# Patient Record
Sex: MALE | Race: WHITE
[De-identification: names, ages, dates, MRNs, and addresses within clinical notes are randomized per-mention and may not be internally consistent; named-entity substitution may affect disease eponyms.]

---

## 2020-06-26 ENCOUNTER — HOSPITAL ENCOUNTER (INPATIENT)
Dept: HOSPITAL 74 - JER | Age: 60
LOS: 6 days | Discharge: HOME | DRG: 975 | End: 2020-07-02
Attending: FAMILY MEDICINE | Admitting: FAMILY MEDICINE
Payer: COMMERCIAL

## 2020-06-26 VITALS — BODY MASS INDEX: 24.3 KG/M2

## 2020-06-26 DIAGNOSIS — B15.9: ICD-10-CM

## 2020-06-26 DIAGNOSIS — Z11.59: ICD-10-CM

## 2020-06-26 DIAGNOSIS — B59: ICD-10-CM

## 2020-06-26 DIAGNOSIS — D63.8: ICD-10-CM

## 2020-06-26 DIAGNOSIS — R16.1: ICD-10-CM

## 2020-06-26 DIAGNOSIS — D70.9: ICD-10-CM

## 2020-06-26 DIAGNOSIS — M21.379: ICD-10-CM

## 2020-06-26 DIAGNOSIS — E86.0: ICD-10-CM

## 2020-06-26 DIAGNOSIS — F10.21: ICD-10-CM

## 2020-06-26 DIAGNOSIS — J44.9: ICD-10-CM

## 2020-06-26 DIAGNOSIS — B37.0: ICD-10-CM

## 2020-06-26 DIAGNOSIS — R74.8: ICD-10-CM

## 2020-06-26 DIAGNOSIS — I10: ICD-10-CM

## 2020-06-26 DIAGNOSIS — E78.5: ICD-10-CM

## 2020-06-26 DIAGNOSIS — B20: Primary | ICD-10-CM

## 2020-06-26 DIAGNOSIS — E11.9: ICD-10-CM

## 2020-06-26 DIAGNOSIS — R63.4: ICD-10-CM

## 2020-06-26 DIAGNOSIS — R19.7: ICD-10-CM

## 2020-06-26 DIAGNOSIS — R63.0: ICD-10-CM

## 2020-06-26 DIAGNOSIS — K76.0: ICD-10-CM

## 2020-06-26 DIAGNOSIS — K80.50: ICD-10-CM

## 2020-06-26 LAB
ALBUMIN SERPL-MCNC: 2.7 G/DL (ref 3.4–5)
ALP SERPL-CCNC: 100 U/L (ref 45–117)
ALT SERPL-CCNC: 23 U/L (ref 13–61)
ANION GAP SERPL CALC-SCNC: 14 MMOL/L (ref 8–16)
APPEARANCE UR: CLEAR
AST SERPL-CCNC: 69 U/L (ref 15–37)
BASOPHILS # BLD: 1.1 % (ref 0–2)
BILIRUB SERPL-MCNC: 0.8 MG/DL (ref 0.2–1)
BILIRUB UR STRIP.AUTO-MCNC: NEGATIVE MG/DL
BUN SERPL-MCNC: 54.3 MG/DL (ref 7–18)
CALCIUM SERPL-MCNC: 8.8 MG/DL (ref 8.5–10.1)
CHLORIDE SERPL-SCNC: 104 MMOL/L (ref 98–107)
CO2 SERPL-SCNC: 16 MMOL/L (ref 21–32)
COLOR UR: YELLOW
CREAT SERPL-MCNC: 1.6 MG/DL (ref 0.55–1.3)
DEPRECATED RDW RBC AUTO: 15.3 % (ref 11.9–15.9)
EOSINOPHIL # BLD: 3 % (ref 0–4.5)
GLUCOSE SERPL-MCNC: 88 MG/DL (ref 74–106)
HCT VFR BLD CALC: 33.9 % (ref 35.4–49)
HGB BLD-MCNC: 11.3 GM/DL (ref 11.7–16.9)
INR BLD: 1.18 (ref 0.83–1.09)
KETONES UR QL STRIP: (no result)
LEUKOCYTE ESTERASE UR QL STRIP.AUTO: NEGATIVE
LIPASE SERPL-CCNC: 215 U/L (ref 73–393)
LYMPHOCYTES # BLD: 9.8 % (ref 8–40)
MAGNESIUM SERPL-MCNC: 2.1 MG/DL (ref 1.8–2.4)
MCH RBC QN AUTO: 27 PG (ref 25.7–33.7)
MCHC RBC AUTO-ENTMCNC: 33.3 G/DL (ref 32–35.9)
MCV RBC: 81.3 FL (ref 80–96)
MONOCYTES # BLD AUTO: 6.1 % (ref 3.8–10.2)
NEUTROPHILS # BLD: 80 % (ref 42.8–82.8)
NITRITE UR QL STRIP: NEGATIVE
PH UR: 5 [PH] (ref 5–8)
PLATELET # BLD AUTO: 275 K/MM3 (ref 134–434)
PMV BLD: 8.9 FL (ref 7.5–11.1)
POTASSIUM SERPLBLD-SCNC: 5 MMOL/L (ref 3.5–5.1)
PROT SERPL-MCNC: 8.4 G/DL (ref 6.4–8.2)
PROT UR QL STRIP: (no result)
PROT UR QL STRIP: NEGATIVE
PT PNL PPP: 13.9 SEC (ref 9.7–13)
RBC # BLD AUTO: 4.17 M/MM3 (ref 4–5.6)
SODIUM SERPL-SCNC: 134 MMOL/L (ref 136–145)
SP GR UR: 1.04 (ref 1.01–1.03)
UROBILINOGEN UR STRIP-MCNC: 0.2 MG/DL (ref 0.2–1)
WBC # BLD AUTO: 4.5 K/MM3 (ref 4–10)

## 2020-06-26 PROCEDURE — G0009 ADMIN PNEUMOCOCCAL VACCINE: HCPCS

## 2020-06-26 PROCEDURE — A9579 GAD-BASE MR CONTRAST NOS,1ML: HCPCS

## 2020-06-26 PROCEDURE — U0003 INFECTIOUS AGENT DETECTION BY NUCLEIC ACID (DNA OR RNA); SEVERE ACUTE RESPIRATORY SYNDROME CORONAVIRUS 2 (SARS-COV-2) (CORONAVIRUS DISEASE [COVID-19]), AMPLIFIED PROBE TECHNIQUE, MAKING USE OF HIGH THROUGHPUT TECHNOLOGIES AS DESCRIBED BY CMS-2020-01-R: HCPCS

## 2020-06-26 RX ADMIN — BUDESONIDE AND FORMOTEROL FUMARATE DIHYDRATE SCH: 80; 4.5 AEROSOL RESPIRATORY (INHALATION) at 23:40

## 2020-06-26 NOTE — EKG
Test Reason : 

Blood Pressure : ***/*** mmHG

Vent. Rate : 123 BPM     Atrial Rate : 123 BPM

   P-R Int : 136 ms          QRS Dur : 066 ms

    QT Int : 292 ms       P-R-T Axes : 073 026 037 degrees

   QTc Int : 418 ms

 

SINUS TACHYCARDIA

NO PREVIOUS ECGS AVAILABLE

Confirmed by PHOENIX WHITT MD ([id number]) on 6/26/2020 2:42:02 PM

 

Referred By:             Confirmed By:PHOENIX WHITT MD

## 2020-06-26 NOTE — HP
Admitting History and Physical





- Admission


Chief Complaint: pt c/o loose bm 2 to 3 mths nausea loss wt 23 lbs weak dizzy no

cp no sob.  no palpitations no abd pain pt poor historian daughter with him 

renal numbers indicate dehydration ? ct result chest also mnoted ground glass ? 

?covid lt foot drop chronic sed to accident in past


History Source: Patient, Family Member


Limitations to Obtaining History: No Limitations





- Past Medical History


CNS: Yes: Other (chr foot droop lt foot)


Cardiovascular: Yes: HTN


Endocrine: Yes: Diabetes Mellitus





- Past Surgical History


Past Surgical History: Yes: Cholecystectomy, Hernia Repair, Splenectomy





- Smoking History


Smoking history: Former smoker


Have you smoked in the past 12 months: No





- Alcohol/Substance Use


Hx Alcohol Use: No





- Social History


Usual Living Arrangement: Yes: With Spouse


Do you think of yourself as: Straight/Heterosexual


History of Recent Travel: No





Home Medications





- Allergies


Allergies/Adverse Reactions: 


                                    Allergies











Allergy/AdvReac Type Severity Reaction Status Date / Time


 


No Known Allergies Allergy   Verified 06/26/20 12:49














- Home Medications


Home Medications: 


Ambulatory Orders





Atorvastatin Ca [Lipitor] 20 mg PO HS 06/26/20 


Budesonide/Formeterol Fumarate [SYMBICORT 80/4.5mcg -] 1 inh PO BID 06/26/20 


Ferrous Sulfate [Iron] 325 mg PO DAILY 06/26/20 


Lisinopril [Prinivil] 10 mg PO DAILY 06/26/20 











Family Medical History


Family History: Unremarkable





Review of Systems





- Review of Systems


Constitutional: reports: Malaise


Eyes: reports: Blurred Vision


HENT: reports: No Symptoms


Neck: reports: No Symptoms


Cardiovascular: reports: No Symptoms


Respiratory: reports: SOB on Exertion


Gastrointestinal: reports: Diarrhea, Other (nausea)


Genitourinary: reports: No Symptoms


Breasts: reports: No Symptoms Reported


Musculoskeletal: reports: Back Pain


Integumentary: reports: No Symptoms


Neurological: reports: Other (lt foor chr foot drop)


Endocrine: reports: Unexplained Weight Loss


Hematology/Lymphatic: reports: No Symptoms


Psychiatric: reports: No Symptoms





Physical Examination


Vital Signs: 


                                   Vital Signs











Temperature  98.7 F   06/26/20 17:41


 


Pulse Rate  100 H  06/26/20 17:41


 


Respiratory Rate  22 H  06/26/20 12:57


 


Blood Pressure  125/72   06/26/20 17:41


 


O2 Sat by Pulse Oximetry (%)  96   06/26/20 17:41











Constitutional: Yes: Mild Distress


Eyes: Yes: WNL


HENT: Yes: WNL


Neck: Yes: WNL


Cardiovascular: Yes: Tachycardia, Other (low jose f meds heid)


Respiratory: Yes: SOB on Exertion


Gastrointestinal: Yes: Hyperactive Bowel Sounds


...Rectal Exam: Yes: Deferred


Renal/: Yes: WNL


Breast(s): Yes: WNL


Musculoskeletal: Yes: Back Pain


Extremities: Yes: Other (lt leg scared sed accident)


Edema: No


Peripheral Pulses WNL: Yes


Peripheral Pulses: Left Radial: 2+, Right Radial: 2+, Left Doralis Pedis: 2+, 

Right Dorsalis Pedis: 2+, Left Femoral: 2+, Right Femoral: 2+


Integumentary: Yes: WNL


Neurological: Yes: Weakness, Other (lt foot drop)


...Motor Strength: WNL


Psychiatric: Yes: WNL


Labs: 


                                    CBC, BMP





                                 06/26/20 13:25 





                                 06/26/20 13:25 











Assessment/Plan





dehydration


loose bm sed ?metformin


weakness


?vertigo chronic


iron anemia


pneumonia ?covid


diabetes


low blood press


s tach


loss wt 23 lbs etiology unknown yet? diaerea??


h/o m?dvt cellulitis zunilda  legs????? will ask pt 


blurry vision?


ibs d good on xiffixan in past





plan iv explore hepatibiliary more gi f/u hold bp meds dm diet dvt prophylaxis 

finger sticks w/u anemia 


antibiotics iv chk covid id f/u hold metformin see if pt had splenectomy in past

 vacc all up to date 


f/u labs in am carotd echo to be done blurry visionekg in am to see hr down

## 2020-06-26 NOTE — PDOC
History of Present Illness





- General


Chief Complaint: Irregular Heart Beat


Stated Complaint: SENT BY PCP





- History of Present Illness


Initial Comments: 





06/26/20 13:20


59 y/o M hx of COPD, HTN, HLD,diabetes suspected IBS presents to the ER from 

PCP's office with concern for tachycardia, and 22lb weight loss over the last 2-

3 wks. Pt reports feeling increasing fatigue, lightheadedness over the last two 

days. pt reports feeling lightheaded upon standing from supine/sitting position,

as well as increased shortness of breath with minimal exertion. He does not use 

home O2 at baseline. He denies any fevers, chills, nausea, vomiting, chest pain 

, wheezing, unilateral leg swelling, numbness,tingling. Pt endorses diarrhea and

soft stools that have been plaguing him over the last 2-3months. Denies any dark

tarry stools or bright blood per rectum. 








PMHx: as noted above


ROS: as noted


Allergies: NKDA











ROS: 


GENERAL/CONSTITUTIONAL: No fever or chills. No weakness.


HEAD, EYES, EARS, NOSE AND THROAT: No change in vision. No ear pain or 

discharge. No sore throat.


CARDIOVASCULAR: No chest pain or shortness of breath


RESPIRATORY: No cough, wheezing, or hemoptysis.


GASTROINTESTINAL: No nausea, vomiting, diarrhea or constipation.


GENITOURINARY: No dysuria, frequency, or change in urination.


MUSCULOSKELETAL: No joint or muscle swelling or pain. No neck or back pain.


SKIN: No rash


NEUROLOGIC: No headache, vertigo, loss of consciousness, or change in s

trength/sensation.


ENDOCRINE: No increased thirst. No abnormal weight change


HEMATOLOGIC/LYMPHATIC: No anemia, easy bleeding, or history of blood clots.


ALLERGIC/IMMUNOLOGIC: No hives or skin allergy.





PE: 


GENERAL: Awake, alert, and fully oriented, in no acute distress


HEAD: No signs of trauma, normocephalic, atraumatic 


EYES: PERRLA, EOMI, sclera anicteric, conjunctiva clear


ENT: Auricles normal inspection, hearing grossly normal, nares patent, 

oropharynx clear without exudates. Moist mucosa


NECK: Normal ROM, supple, no lymphadenopathy, JVD, or masses


LUNGS: No distress, speaks full sentences, clear to auscultation bilaterally 


HEART: Regular rate and rhythm, normal S1 and S2, no murmurs, rubs or gallops, 

peripheral pulses normal and equal bilaterally. 


ABDOMEN: Soft, nontender, normoactive bowel sounds.  No guarding, no rebound.  

No masses


EXTREMITIES : Normal inspection, Normal range of motion, no edema.  No clubbing 

or cyanosis


NEUROLOGICAL: Cranial nerves II through XII grossly intact.  Normal speech, 

normal gait, no focal sensorimotor deficits 


SKIN: Warm, Dry, normal turgor, no rashes or lesions noted





06/26/20 13:44





06/26/20 14:27





06/26/20 17:02





06/28/20 18:47








Past History





- Medical History


Allergies/Adverse Reactions: 


                                    Allergies











Allergy/AdvReac Type Severity Reaction Status Date / Time


 


No Known Allergies Allergy   Verified 06/26/20 12:49











Home Medications: 


Ambulatory Orders





Atorvastatin Ca [Lipitor] 20 mg PO HS 06/26/20 


Ferrous Sulfate [Iron] 325 mg PO DAILY 06/26/20 


Lisinopril [Prinivil] 10 mg PO DAILY 06/26/20 


Budesonide/Formeterol Fumarate [SYMBICORT 160/4.5mcg -] 1 inh PO BID 06/27/20 








COPD: Yes


Diabetes: Yes


HTN: Yes


Other medical history: IBS





- Surgical History


GI Surgery: Yes (Gastric hernias (7) repairs with Mesh.)





- Immunization History


Immunization Up to Date: Yes





- Psycho-Social/Smoking History


Smoking History: Former smoker


Have you smoked in the past 12 months: No


Information on smoking cessation initiated: No





- Substance Abuse Hx (Audit-C & DAST Scrn)


How often the patient has a drink containing alcohol: Never


Score: In Men: 4 or > Positive; In Women: 3 or > Positive: 0


Screen Result (Pos requires Nsg. Audit-10AR): Negative


In the last yr the pt used illegal drug/Rx for NonMed reason: No


Score:  Yes response is considered Positive: 0


Screen Result (Positive result requires Nsg. DAST-10): Negative





*Physical Exam





- Vital Signs


                                Last Vital Signs











Temp Pulse Resp BP Pulse Ox


 


 97.1 F L  135 H  22 H  100/81   95 


 


 06/26/20 12:57  06/26/20 12:57  06/26/20 12:57  06/26/20 12:57  06/26/20 12:57














ED Treatment Course





- LABORATORY


CBC & Chemistry Diagram: 


                                 06/28/20 05:25





                                 06/28/20 05:25





Medical Decision Making





- Medical Decision Making





06/26/20 13:42


59 y/o M hx of COPD, HTN, HLD, suspected IBS presents to the ER from PCP's 

office with concern for tachycardia, and 22lb weight loss over the last 2-3 wks


p.e (especially in context of weight loss, possible malignancy), dehydration, 

acs, copd exacerbation.  


06/26/20 13:43


EKG: sinus tachycardia, no st elevation, intervals of normal duration. 


06/26/20 13:53





06/26/20 14:55


labs with mildly elevated creatinine of 1.6. 


however due to concerns for p.e/malignancy, reasons for


pursuing cta. abdomen and pelvis ct discussed with patient


and he agrees to having contrast. 


06/26/20 17:14


CXR


Chest CTA


ct abdomen and pelvis. 


pt still unable to urinate. 


06/26/20 18:19





ct abdomen and pelvis


-probable choledocholithiasis 


dilated cbd 


post surgical changes consistent with hernia repair 





chest CTa: ground glass opacities, no pulmonary embolism 


pt will be admitted to Dr. Jacob, 


swabbed for covid-19 due to ct findings and admission purposes


tachycardia resolved. 


06/28/20 18:48








Discharge





- Discharge Information


Problems reviewed: Yes


Clinical Impression/Diagnosis: 


 Shortness of breath





Condition: Stable





- Follow up/Referral





- Patient Discharge Instructions





- Post Discharge Activity

## 2020-06-26 NOTE — PDOC
Rapid Medical Evaluation


Chief Complaint: Irregular Heart Beat


Medical Evaluation: 


                                    Allergies











Allergy/AdvReac Type Severity Reaction Status Date / Time


 


No Known Allergies Allergy   Verified 06/26/20 12:49








Vital Signs











Temp Pulse Resp BP Pulse Ox


 


 97.1 F L  135 H  22 H  100/81   95 


 


 06/26/20 12:57  06/26/20 12:57  06/26/20 12:57  06/26/20 12:57  06/26/20 12:57








06/26/20 13:00


CC: sob, diarrhea wt loss 22 lb x2-3 months, no fever, no bloody stools


ExaM: tachy, pale, lcta


Plan: labs, cxr, ekg, urine





**Discharge Disposition





- Diagnosis


 Shortness of breath








- Referrals





- Patient Instructions





- Post Discharge Activity

## 2020-06-26 NOTE — PDOC
History of Present Illness





- General


Chief Complaint: Irregular Heart Beat


Stated Complaint: SENT BY PCP





Past History





- Medical History


Allergies/Adverse Reactions: 


                                    Allergies











Allergy/AdvReac Type Severity Reaction Status Date / Time


 


No Known Allergies Allergy   Verified 06/26/20 12:49











Home Medications: 


Ambulatory Orders





Lisinopril 5 mg PO DAILY #7 tablet 05/17/16 


Metformin HCl [Glucophage] 1,000 mg PO BID #14 tablet 05/17/16 








Diabetes: Yes


HTN: Yes





- Surgical History


GI Surgery: Yes (Gastric hernias (7) repairs with Mesh.)





- Psycho-Social/Smoking History


Smoking History: Former smoker


Have you smoked in the past 12 months: No

## 2020-06-26 NOTE — PDOC
Documentation entered by Misty Doyle SCRIBE, acting as scribe for 

Vicki Love MD.








Vicki Love MD:  This documentation has been prepared by the scribeVivek Ana, SCRIBE, under my direction and personally reviewed by me in its 

entirety.  I confirm that the documentation accurately reflects all work, 

treatment, procedures, and medical decision making performed by me.  





Attending Attestation





- Resident


Resident Name: LorneMagdalenenancy





- ED Attending Attestation


I have performed the following: I have examined & evaluated the patient, The 

case was reviewed & discussed with the resident, I agree w/resident's findings &

plan, Exceptions are as noted





- HPI


HPI: 





06/26/20 12:57


Patient is a 60 year old male with a significant past medical history of COPD, 

HTn, HLD, and suspected IBS who presents to the ED from PCP's office concerning 

tachycardia and significant weight loss within the past 2/3 weeks (22lb). 

Patient stated he has been feeling fatigue and lightheaded for the past few days

which is worse when standing up along with SOB which is worse upon minimal 

exertion. Patient stated he has been having diarrhea and very soft stools x2/3 

months.


Patient denies any fevers, chills, nausea, vision changes, vomiting, cough, 

orthopnea, chest pain, palpitations, wheezing, abdominal pain, urinary symptoms,

constipation, BPR, unilateral leg swelling, numbness,tingling





Allergies: NKDA








 


   








- Physicial Exam


PE: 





06/26/20 14:47


General: non-toxic appearing


Chest: CTAB, good air entry, n  wheezes rales or rhonchi


CVS: +s1 s2, tachycardic


Abdomen: soft nt nd, no rebound no guarding





- Medical Decision Making





06/26/20 14:49


59 yo M with tachycardia and diarrhea and unintentional weight loss, concerning 

for dehydration vs. electrolyte abnormality vs. PE vs. malignancy vs. colitis 

vs. IBD vs. IBS vs. HIV. 





Plan:


-labs


-cxr


-EKG


-CT PE protocol


-CT a/p


-urine


-admit 





This clinical encounter is taking place during a federal and state health care 

emergency attributable to the novel Corona Virus pandemic.


The [place] of the Department of Health and Human Services has declared, 

pursuant to the Public Health Service Act  319F-3 (42 U.S.C.  247d-6d), that a

covered persons activities related to medical countermeasures against COVID-19 

will be immune from liability under Federal and State law.











Discharge





- Discharge Information


Problems reviewed: Yes


Clinical Impression/Diagnosis: 


 Shortness of breath





Condition: Stable





- Follow up/Referral





- Patient Discharge Instructions





- Post Discharge Activity

## 2020-06-27 LAB
ALBUMIN SERPL-MCNC: 2.4 G/DL (ref 3.4–5)
ALP SERPL-CCNC: 90 U/L (ref 45–117)
ALT SERPL-CCNC: 19 U/L (ref 13–61)
ANION GAP SERPL CALC-SCNC: 10 MMOL/L (ref 8–16)
AST SERPL-CCNC: 62 U/L (ref 15–37)
BASOPHILS # BLD: 1.1 % (ref 0–2)
BILIRUB DIRECT SERPL-MCNC: 510 U/L (ref 87–246)
BILIRUB SERPL-MCNC: 0.8 MG/DL (ref 0.2–1)
BUN SERPL-MCNC: 40.9 MG/DL (ref 7–18)
CALCIUM SERPL-MCNC: 8.5 MG/DL (ref 8.5–10.1)
CHLORIDE SERPL-SCNC: 108 MMOL/L (ref 98–107)
CO2 SERPL-SCNC: 20 MMOL/L (ref 21–32)
CREAT SERPL-MCNC: 1.2 MG/DL (ref 0.55–1.3)
DEPRECATED RDW RBC AUTO: 15.6 % (ref 11.9–15.9)
EOSINOPHIL # BLD: 3.8 % (ref 0–4.5)
GLUCOSE SERPL-MCNC: 85 MG/DL (ref 74–106)
HCT VFR BLD CALC: 30.5 % (ref 35.4–49)
HGB BLD-MCNC: 10.2 GM/DL (ref 11.7–16.9)
LYMPHOCYTES # BLD: 8 % (ref 8–40)
MCH RBC QN AUTO: 27.1 PG (ref 25.7–33.7)
MCHC RBC AUTO-ENTMCNC: 33.5 G/DL (ref 32–35.9)
MCV RBC: 81 FL (ref 80–96)
MONOCYTES # BLD AUTO: 7.2 % (ref 3.8–10.2)
NEUTROPHILS # BLD: 79.9 % (ref 42.8–82.8)
PLATELET # BLD AUTO: 242 K/MM3 (ref 134–434)
PMV BLD: 8.4 FL (ref 7.5–11.1)
POTASSIUM SERPLBLD-SCNC: 4.8 MMOL/L (ref 3.5–5.1)
PROT SERPL-MCNC: 7.3 G/DL (ref 6.4–8.2)
RBC # BLD AUTO: 3.76 M/MM3 (ref 4–5.6)
SODIUM SERPL-SCNC: 137 MMOL/L (ref 136–145)
WBC # BLD AUTO: 3.3 K/MM3 (ref 4–10)

## 2020-06-27 RX ADMIN — BUDESONIDE AND FORMOTEROL FUMARATE DIHYDRATE SCH PUFF: 80; 4.5 AEROSOL RESPIRATORY (INHALATION) at 11:25

## 2020-06-27 RX ADMIN — ENOXAPARIN SODIUM SCH MG: 40 INJECTION SUBCUTANEOUS at 09:00

## 2020-06-27 RX ADMIN — SODIUM CHLORIDE SCH MLS/HR: 9 INJECTION, SOLUTION INTRAVENOUS at 17:57

## 2020-06-27 RX ADMIN — BUDESONIDE AND FORMOTEROL FUMARATE DIHYDRATE SCH PUFF: 80; 4.5 AEROSOL RESPIRATORY (INHALATION) at 21:12

## 2020-06-27 RX ADMIN — ACETAMINOPHEN PRN MG: 325 TABLET ORAL at 17:13

## 2020-06-27 RX ADMIN — SODIUM CHLORIDE SCH MLS/HR: 9 INJECTION, SOLUTION INTRAVENOUS at 08:38

## 2020-06-27 RX ADMIN — ACETAMINOPHEN PRN MG: 325 TABLET ORAL at 06:05

## 2020-06-27 NOTE — PN
Progress Note, Physician


History of Present Illness: 





as is asked more regarding diarrea he is better hisytorian today


states i tx w flagyl got better 1 mth abo so loose bm may not be metformin 

related


wbc down today but he feels better


spoke to id and pulm


gi not needed yet


oob


loose bm no change no abd pain








- Current Medication List


Current Medications: 


Active Medications





Acetaminophen (Tylenol -)  650 mg PO Q4H PRN


   PRN Reason: FEVER


   Last Admin: 06/27/20 06:05 Dose:  650 mg


   Documented by: 


Albuterol Sulfate (Ventolin Hfa Inhaler -)  2 puff IH Q4H PRN


   PRN Reason: SHORT OF BREATH/WHEEZING


Budesonide/Formoterol Fumarate (Symbicort 80/4.5mcg -)  2 puff IH BID [facility]


   Last Admin: 06/26/20 23:40 Dose:  Not Given


   Documented by: 


Enoxaparin Sodium (Lovenox -)  40 mg SQ DAILY [facility]


   Last Admin: 06/27/20 09:00 Dose:  40 mg


   Documented by: 


Sodium Chloride (Normal Saline -)  1,000 mls @ 100 mls/hr IV ASDIR [facility]


   Last Admin: 06/27/20 08:38 Dose:  100 mls/hr


   Documented by: 


Pneumococcal 13-Valent Conj Vacc (Prevnar 13 Syringe -)  0.5 ml IM .ONCE ONE


   Stop: 06/26/20 19:41











- Objective


Vital Signs: 


                                   Vital Signs











Temperature  100.8 F H  06/27/20 06:00


 


Pulse Rate  127 H  06/27/20 06:00


 


Respiratory Rate  18   06/27/20 06:00


 


Blood Pressure  155/63   06/27/20 06:00


 


O2 Sat by Pulse Oximetry (%)  95   06/27/20 00:00











Constitutional: Yes: No Distress


Eyes: Yes: WNL, Occular Prosthesis


Neck: Yes: Rigid


Cardiovascular: Yes: WNL, Tachycardia


Respiratory: Yes: WNL, SOB on Exertion


Gastrointestinal: Yes: Soft


Genitourinary: Yes: WNL


Breast(s): Yes: WNL, Gynecomastia


Extremities: Yes: WNL


Edema: No


Peripheral Pulses WNL: Yes


Peripheral Pulses: Left Radial: 2+, Right Radial: 2+, Left Doralis Pedis: 2+, 

Right Dorsalis Pedis: 2+, Left Femoral: 2+, Right Femoral: 2+


Integumentary: Yes: WNL


Neurological: Yes: WNL


...Motor Strength: WNL


Psychiatric: Yes: WNL


Labs: 


                                    CBC, BMP





                                 06/27/20 06:23 





                                 06/27/20 06:23 





                                    INR, PTT











INR  1.18  (0.83-1.09)  H  06/26/20  13:25    














Assessment/Plan





hiv test


await c diff results before vanco po 125 qid


cont tx as is


pulim pnd


low h/h ?dilutrional


? tx loose bm up to id


cont tx as is lovanox 40 daily not bid


chk labs in am


may need ercp out pt


may get mrcp in hosp porior to d/c

## 2020-06-27 NOTE — CON.ID
Consult


Consult Specialty:: infectious diseases


Referred by:: 


Reason for Consultation:: dirrhoea loss of weight





- History of Present Illness


Chief Complaint: dirrhoea,loss of weight


History of Present Illness: 





relatively healthy patient coming to the hospital because of profuse dirrhoea 


mentions that he has been having dirrhoea for last 3 months and has lost about 

60 pounds


has only dm as well as chr foot drop


currently patient doing well,had one loose bm





- History Source


History Provided By: Patient


Limitations to Obtaining History: No Limitations





- Past Medical History


CNS: Yes: Other (chr foot droop lt foot).  No: Alzheimer's


Cardio/Vascular: Yes: HTN.  No: AFIB


Endocrine: Yes: Diabetes Mellitus





- Past Surgical History


Past Surgical History: Yes: Cholecystectomy, Hernia Repair, Splenectomy





- Alcohol/Substance Use


Hx Alcohol Use: No





- Smoking History


Smoking history: Never smoked


Have you smoked in the past 12 months: No





- Social History


ADL: Independent


History of Recent Travel: No





Home Medications





- Allergies


Allergies/Adverse Reactions: 


                                    Allergies











Allergy/AdvReac Type Severity Reaction Status Date / Time


 


No Known Allergies Allergy   Verified 06/26/20 12:49














- Home Medications


Home Medications: 


Ambulatory Orders





Atorvastatin Ca [Lipitor] 20 mg PO HS 06/26/20 


Budesonide/Formeterol Fumarate [SYMBICORT 80/4.5mcg -] 1 inh PO BID 06/26/20 


Ferrous Sulfate [Iron] 325 mg PO DAILY 06/26/20 


Lisinopril [Prinivil] 10 mg PO DAILY 06/26/20 











Review of Systems





- Review of Systems


Constitutional: reports: Unintentional Wgt. Loss, Weakness


Eyes: reports: No Symptoms


HENT: reports: No Symptoms


Neck: reports: No Symptoms


Cardiovascular: reports: No Symptoms


Respiratory: reports: No Symptoms


Gastrointestinal: reports: Diarrhea, Other


Genitourinary: reports: No Symptoms


Musculoskeletal: reports: No Symptoms


Integumentary: reports: No Symptoms


Neurological: reports: No Symptoms, Weakness


Hematology/Lymphatic: reports: No Symptoms


Psychiatric: reports: No Symptoms





Physical Exam


Vital Signs: 


                                   Vital Signs











Temperature  98.2 F   06/27/20 10:00


 


Pulse Rate  119 H  06/27/20 10:00


 


Respiratory Rate  18   06/27/20 10:00


 


Blood Pressure  100/66   06/27/20 10:00


 


O2 Sat by Pulse Oximetry (%)  92 L  06/27/20 09:00











Constitutional: Yes: Well Nourished, No Distress, Calm


Eyes: Yes: Conjunctiva Clear


HENT: Yes: Atraumatic, Normocephalic


Neck: Yes: Supple, Trachea Midline


Cardiovascular: Yes: Regular Rate and Rhythm


Respiratory: Yes: Regular, CTA Bilaterally


Gastrointestinal: Yes: Normal Bowel Sounds, Soft


Musculoskeletal: Yes: WNL


Extremities: Yes: WNL


Neurological: Yes: Alert, Oriented


Psychiatric: Yes: Alert, Oriented


Labs: 


                                    CBC, BMP





                                 06/27/20 06:23 





                                 06/27/20 06:23 











Imaging





- Results


Chest X-ray: Report Reviewed, Image Reviewed


Cat Scan: Report Reviewed, Image Reviewed





Assessment/Plan





oblem List





- Problems


(1) Choledocholithiasis


Code(s): K80.50 - CALCULUS OF BILE DUCT W/O CHOLANGITIS OR CHOLECYST W/O OBST   





(2) Suspected COVID-19 virus infection


Code(s): Z20.828 - CONTACT W AND EXPOSURE TO OT VIRAL COMMUNICABLE DISEASES   





(3) Pneumonitis


Code(s): J18.9 - PNEUMONIA, UNSPECIFIED ORGANISM   





(4) Weight loss


Code(s): R63.4 - ABNORMAL WEIGHT LOSS   





(5) Anemia


Code(s): D64.9 - ANEMIA, UNSPECIFIED   





(6) Foot drop


Code(s): M21.379 - FOOT DROP, UNSPECIFIED FOOT   





(7) Diarrhea


Code(s): R19.7 - DIARRHEA, UNSPECIFIED   








this patient with dirrhoea and loss of weight which has not been controlled


i think there is pathologic process going on and d/d includes a long list


1 could be ibd and chrons or ulcerative colitis


2.viruses parasitic or bacterial infection


3.malignancy


4 endocrine issues





i have send a kaylin for cx and ova and parsite


covid is pending


will await for results


think he will eed colonoscopy and biopsies if we do ot get an answer


also i am going to get stool for fat and osmolalities

## 2020-06-27 NOTE — CON.PULM
Consult


Consult Specialty:: PULMONARY


Referred by:: BLACK


Reason for Consultation:: ABN CT CHEST





- History of Present Illness


Chief Complaint: WEIGHT LOSS/FREQ BM'S/ANOREXIA.  MINOR COUGH WITH MILD SOB





- History Source


History Provided By: Patient, Medical Record


Limitations to Obtaining History: No Limitations





- Past Medical History


CNS: Yes: Other (chr foot droop lt foot).  No: Alzheimer's


Cardio/Vascular: Yes: HTN.  No: AFIB


Endocrine: Yes: Diabetes Mellitus





- Past Surgical History


Past Surgical History: Yes: Cholecystectomy, Hernia Repair, Splenectomy





- Alcohol/Substance Use


Hx Alcohol Use: No





- Smoking History


Smoking history: Never smoked


Have you smoked in the past 12 months: No





- Social History


ADL: Independent


Place of Birth: [place]


History of Recent Travel: No





Home Medications





- Allergies


Allergies/Adverse Reactions: 


                                    Allergies











Allergy/AdvReac Type Severity Reaction Status Date / Time


 


No Known Allergies Allergy   Verified 06/26/20 12:49














- Home Medications


Home Medications: 


Ambulatory Orders





Atorvastatin Ca [Lipitor] 20 mg PO HS 06/26/20 


Budesonide/Formeterol Fumarate [SYMBICORT 80/4.5mcg -] 1 inh PO BID 06/26/20 


Ferrous Sulfate [Iron] 325 mg PO DAILY 06/26/20 


Lisinopril [Prinivil] 10 mg PO DAILY 06/26/20 











Family Medical History


Family History: Unremarkable





Review of Systems





- Review of Systems


Constitutional: reports: Unintentional Wgt. Loss.  denies: Chills, Diaphoresis, 

Fever, Night Sweats


Eyes: denies: Blind Spots


HENT: denies: Difficult Swallowing


Neck: denies: Decreased ROM


Cardiovascular: reports: Shortness of Breath.  denies: Chest Pain, Edema, 

Palpitations


Respiratory: reports: Cough, SOB on Exertion.  denies: Exercise Intolerance, 

Hemoptysis, Orthopnea, Wheezing


Gastrointestinal: reports: Diarrhea, Indigestion.  denies: Abdominal Pain, 

Rectal Bleeding, Vomiting


Genitourinary: reports: No Symptoms


Breasts: reports: No Symptoms Reported


Musculoskeletal: reports: No Symptoms


Integumentary: reports: No Symptoms


Neurological: reports: Pre-Existing Deficit (FOOT DROP)


Endocrine: reports: No Symptoms


Hematology/Lymphatic: reports: No Symptoms





Physical Exam


Vital Sings: 


                                   Vital Signs











Temperature  98.2 F   06/27/20 10:00


 


Pulse Rate  119 H  06/27/20 10:00


 


Respiratory Rate  18   06/27/20 10:00


 


Blood Pressure  100/66   06/27/20 10:00


 


O2 Sat by Pulse Oximetry (%)  92 L  06/27/20 09:00











Constitutional: Yes: Calm


Eyes: Yes: EOM Intact


HENT: Yes: Normocephalic


Neck: Yes: Trachea Midline


Cardiovascular: Yes: Regular Rate and Rhythm, Tachycardia


Respiratory: Yes: Cough, Rales


Gastrointestinal: Yes: Normal Bowel Sounds, Soft


Renal/: Yes: WNL


Breast(s): Yes: WNL


Musculoskeletal: Yes: WNL


Extremities: Yes: Other (FOOT DROP)


Integumentary: Yes: WNL


Neurological: Yes: Alert


Labs: 


                                    CBC, BMP





                                 06/27/20 06:23 





                                 06/27/20 06:23 











COVID PENDING


HGB 10.2


WBC 3.3  LYMPHOCYTES8%


D-DIMER PENDING


FERRITIN 1057


AST 62





CRP 3.6


ALB 2.4


U/A  KETONES/SP. GRAV 1.040





Imaging





- Results


Chest X-ray: Report Reviewed, Image Reviewed


Cat Scan: Report Reviewed, Image Reviewed


EKG: Report Reviewed, Image Reviewed





Problem List





- Problems


(1) Choledocholithiasis


Code(s): K80.50 - CALCULUS OF BILE DUCT W/O CHOLANGITIS OR CHOLECYST W/O OBST   





(2) Suspected COVID-19 virus infection


Code(s): Z20.828 - CONTACT W AND EXPOSURE TO OTH VIRAL COMMUNICABLE DISEASES   





(3) Pneumonitis


Code(s): J18.9 - PNEUMONIA, UNSPECIFIED ORGANISM   





(4) Weight loss


Code(s): R63.4 - ABNORMAL WEIGHT LOSS   





(5) Anemia


Code(s): D64.9 - ANEMIA, UNSPECIFIED   





(6) Foot drop


Code(s): M21.379 - FOOT DROP, UNSPECIFIED FOOT   





(7) Diarrhea


Code(s): R19.7 - DIARRHEA, UNSPECIFIED   





Assessment/Plan





DIFFUSE B/L GGO ON CT CHEST WITH CURRENT SYMPTOMATOLOGY IS HIGHLY SUSPICIOUS FOR

 COVID-19 INFECTION


DEHYDRATION/WEIGHT LOSS


GASTROINTESTINAL SYMPTOMS R/O COVID R/O CONCURRENT IBS/CT EVEDENCE FOR BAIRON

DOCHOLITHIASIS








DROPLET PRECAUTIONS


O2 TO KEEP SAT GREATER THAN 90%


IV FLUID REPLACEMENT


GI EVAL


CHECK D-DIMER


CHECK COVID SWAB/ANTIBODIES


CRP UNDER 10 PRECLUDES STEROIDS FOR SUSPECTED COVID


WOULD CONTINUE DVT PROPHYLAXIS





WILL FOLLOW





THANK YOU





LINDSAY DEVINE MD

## 2020-06-28 LAB
ANION GAP SERPL CALC-SCNC: 6 MMOL/L (ref 8–16)
BASOPHILS # BLD: 1.2 % (ref 0–2)
BUN SERPL-MCNC: 25.8 MG/DL (ref 7–18)
CALCIUM SERPL-MCNC: 8 MG/DL (ref 8.5–10.1)
CHLORIDE SERPL-SCNC: 111 MMOL/L (ref 98–107)
CO2 SERPL-SCNC: 22 MMOL/L (ref 21–32)
CREAT SERPL-MCNC: 1.1 MG/DL (ref 0.55–1.3)
DEPRECATED RDW RBC AUTO: 15.9 % (ref 11.9–15.9)
EOSINOPHIL # BLD: 2.5 % (ref 0–4.5)
GLUCOSE SERPL-MCNC: 78 MG/DL (ref 74–106)
HCT VFR BLD CALC: 28.7 % (ref 35.4–49)
HGB BLD-MCNC: 9.5 GM/DL (ref 11.7–16.9)
LYMPHOCYTES # BLD: 7.2 % (ref 8–40)
MCH RBC QN AUTO: 26.7 PG (ref 25.7–33.7)
MCHC RBC AUTO-ENTMCNC: 33 G/DL (ref 32–35.9)
MCV RBC: 80.8 FL (ref 80–96)
MONOCYTES # BLD AUTO: 4.4 % (ref 3.8–10.2)
NEUTROPHILS # BLD: 84.7 % (ref 42.8–82.8)
PLATELET # BLD AUTO: 209 K/MM3 (ref 134–434)
PMV BLD: 8.1 FL (ref 7.5–11.1)
POTASSIUM SERPLBLD-SCNC: 5.1 MMOL/L (ref 3.5–5.1)
RBC # BLD AUTO: 3.55 M/MM3 (ref 4–5.6)
SODIUM SERPL-SCNC: 139 MMOL/L (ref 136–145)
WBC # BLD AUTO: 2.9 K/MM3 (ref 4–10)

## 2020-06-28 RX ADMIN — SODIUM CHLORIDE SCH MLS/HR: 9 INJECTION, SOLUTION INTRAVENOUS at 05:53

## 2020-06-28 RX ADMIN — ACETAMINOPHEN PRN MG: 325 TABLET ORAL at 12:05

## 2020-06-28 RX ADMIN — ATORVASTATIN CALCIUM SCH MG: 20 TABLET, FILM COATED ORAL at 21:47

## 2020-06-28 RX ADMIN — BUDESONIDE AND FORMOTEROL FUMARATE DIHYDRATE SCH PUFF: 80; 4.5 AEROSOL RESPIRATORY (INHALATION) at 21:48

## 2020-06-28 RX ADMIN — ENOXAPARIN SODIUM SCH MG: 80 INJECTION SUBCUTANEOUS at 21:47

## 2020-06-28 RX ADMIN — CEFEPIME HYDROCHLORIDE SCH MLS/HR: 1 INJECTION, POWDER, FOR SOLUTION INTRAMUSCULAR; INTRAVENOUS at 14:55

## 2020-06-28 RX ADMIN — PANTOPRAZOLE SODIUM SCH MG: 40 TABLET, DELAYED RELEASE ORAL at 09:10

## 2020-06-28 RX ADMIN — METHYLPREDNISOLONE SODIUM SUCCINATE SCH MG: 40 INJECTION, POWDER, FOR SOLUTION INTRAMUSCULAR; INTRAVENOUS at 21:47

## 2020-06-28 RX ADMIN — CEFEPIME HYDROCHLORIDE SCH MLS/HR: 1 INJECTION, POWDER, FOR SOLUTION INTRAMUSCULAR; INTRAVENOUS at 17:39

## 2020-06-28 RX ADMIN — SODIUM CHLORIDE SCH: 9 INJECTION, SOLUTION INTRAVENOUS at 07:19

## 2020-06-28 RX ADMIN — ACETAMINOPHEN PRN MG: 325 TABLET ORAL at 05:53

## 2020-06-28 RX ADMIN — AZITHROMYCIN SCH MG: 250 TABLET, FILM COATED ORAL at 11:48

## 2020-06-28 RX ADMIN — ENOXAPARIN SODIUM SCH MG: 40 INJECTION SUBCUTANEOUS at 09:10

## 2020-06-28 RX ADMIN — BUDESONIDE AND FORMOTEROL FUMARATE DIHYDRATE SCH PUFF: 80; 4.5 AEROSOL RESPIRATORY (INHALATION) at 09:10

## 2020-06-28 RX ADMIN — METHYLPREDNISOLONE SODIUM SUCCINATE SCH MG: 40 INJECTION, POWDER, FOR SOLUTION INTRAMUSCULAR; INTRAVENOUS at 11:48

## 2020-06-28 NOTE — PN
Progress Note, Physician


History of Present Illness: 





events noted


patient became hypoxic and spiked a fever


currently stable


was started on zithro


covid was negative


wbc trending down lower than normal





- Current Medication List


Current Medications: 


Active Medications





Acetaminophen (Tylenol -)  650 mg PO Q4H PRN


   PRN Reason: FEVER


   Last Admin: 06/28/20 12:05 Dose:  650 mg


   Documented by: 


Albuterol Sulfate (Ventolin Hfa Inhaler -)  2 puff IH Q4H PRN


   PRN Reason: SHORT OF BREATH/WHEEZING


Atorvastatin Calcium (Lipitor -)  20 mg PO [facility]


Azithromycin (Zithromax -)  500 mg PO DAILY [facility]


   Last Admin: 06/28/20 11:48 Dose:  500 mg


   Documented by: 


Budesonide/Formoterol Fumarate (Symbicort 80/4.5mcg -)  2 puff IH BID [facility]


   Last Admin: 06/28/20 09:10 Dose:  2 puff


   Documented by: 


Cholestyramine Resin (Questran Packet -)  4 gm PO BID [facility]


   Last Admin: 06/28/20 11:01 Dose:  4 gm


   Documented by: 


Enoxaparin Sodium (Lovenox -)  70 mg SQ BID [facility]


Dextrose/Sodium Chloride (D5-1/2ns -)  1,000 mls @ 125 mls/hr IV ASDIR [facility]


   Last Admin: 06/28/20 11:49 Dose:  125 mls/hr


   Documented by: 


Ceftriaxone Sodium 1 gm/ (Dextrose)  50 mls @ 200 mls/hr IVPB DAILY [facility]; 

Protocol


Methylprednisolone Sodium Succinate (Solu-Medrol -)  40 mg IVPUSH BID [facility]


   Last Admin: 06/28/20 11:48 Dose:  40 mg


   Documented by: 


Ondansetron HCl (Zofran -)  4 mg PO Q8H PRN


   PRN Reason: DYSPEPSIA


   Stop: 07/03/20 10:05


Pantoprazole Sodium (Protonix -)  40 mg PO DAILY [facility]


   Last Admin: 06/28/20 09:10 Dose:  40 mg


   Documented by: 


Pneumococcal 13-Valent Conj Vacc (Prevnar 13 Syringe -)  0.5 ml IM .ONCE ONE


   Stop: 06/26/20 19:41











- Objective


Vital Signs: 


                                   Vital Signs











Temperature  98.4 F   06/28/20 08:38


 


Pulse Rate  99 H  06/28/20 08:38


 


Respiratory Rate  20   06/28/20 09:00


 


Blood Pressure  103/57 L  06/28/20 08:38


 


O2 Sat by Pulse Oximetry (%)  96   06/28/20 09:00











Constitutional: Yes: No Distress, Calm


Cardiovascular: Yes: S1, S2


Respiratory: Yes: Regular, CTA Bilaterally


Musculoskeletal: Yes: WNL


Extremities: Yes: WNL


Neurological: Yes: Alert, Oriented


Psychiatric: Yes: Alert, Oriented


Labs: 


                                    CBC, BMP





                                 06/28/20 05:25 





                                 06/28/20 05:25 





                                    INR, PTT











INR  1.18  (0.83-1.09)  H  06/26/20  13:25    














Assessment/Plan





oblem List





- Problems


(1) Choledocholithiasis


Code(s): K80.50 - CALCULUS OF BILE DUCT W/O CHOLANGITIS OR CHOLECYST W/O OBST   





(2) Suspected COVID-19 virus infection


Code(s): Z20.828 - CONTACT W AND EXPOSURE TO OTH VIRAL COMMUNICABLE DISEASES   





(3) Pneumonitis


Code(s): J18.9 - PNEUMONIA, UNSPECIFIED ORGANISM   





(4) Weight loss


Code(s): R63.4 - ABNORMAL WEIGHT LOSS   





(5) Anemia


Code(s): D64.9 - ANEMIA, UNSPECIFIED   





(6) Foot drop


Code(s): M21.379 - FOOT DROP, UNSPECIFIED FOOT   





(7) Diarrhea


Code(s): R19.7 - DIARRHEA, UNSPECIFIED   








plan


await for all results


gi on board


will add cefepime 


covid negative


monitor wbc


monitor fevers


rest as per the team

## 2020-06-28 NOTE — PN
Progress Note, Physician


History of Present Illness: 





pt with sob on excertion ?lung sustance dz ?covid


doarrea 1 every 3 days ?wacth for obstipation doubt!!!?ibs _d 


pt with h/o fall injuring abd had sepsis w retroperitonil hemotoma evacuated in

[place] to l/s w lt chr foot drop


pnumonia ? covid ?early  ordered igm antibody despite neg iggab  agree w pulm tx




less loose bm today


sob no change


anemia will w/u for anemia of chr disease


get echo resulls will get mrcp? to r/o hepatobil dz 





- Current Medication List


Current Medications: 


Active Medications





Acetaminophen (Tylenol -)  650 mg PO Q4H PRN


   PRN Reason: FEVER


   Last Admin: 06/28/20 05:53 Dose:  650 mg


   Documented by: 


Albuterol Sulfate (Ventolin Hfa Inhaler -)  2 puff IH Q4H PRN


   PRN Reason: SHORT OF BREATH/WHEEZING


Atorvastatin Calcium (Lipitor -)  20 mg PO HS [facility]


Azithromycin (Zithromax -)  500 mg PO DAILY [facility]


   Last Admin: 06/28/20 11:48 Dose:  500 mg


   Documented by: 


Budesonide/Formoterol Fumarate (Symbicort 80/4.5mcg -)  2 puff IH BID [facility]


   Last Admin: 06/28/20 09:10 Dose:  2 puff


   Documented by: 


Cholestyramine Resin (Questran Packet -)  4 gm PO BID [facility]


   Last Admin: 06/28/20 11:01 Dose:  4 gm


   Documented by: 


Enoxaparin Sodium (Lovenox -)  70 mg SQ BID [facility]


Dextrose/Sodium Chloride (D5-1/2ns -)  1,000 mls @ 125 mls/hr IV ASDIR [facility]


   Last Admin: 06/28/20 11:49 Dose:  125 mls/hr


   Documented by: 


Methylprednisolone Sodium Succinate (Solu-Medrol -)  40 mg IVPUSH BID [facility]


   Last Admin: 06/28/20 11:48 Dose:  40 mg


   Documented by: 


Ondansetron HCl (Zofran -)  4 mg PO Q8H PRN


   PRN Reason: DYSPEPSIA


   Stop: 07/03/20 10:05


Pantoprazole Sodium (Protonix -)  40 mg PO DAILY [facility]


   Last Admin: 06/28/20 09:10 Dose:  40 mg


   Documented by: 


Pneumococcal 13-Valent Conj Vacc (Prevnar 13 Syringe -)  0.5 ml IM .ONCE ONE


   Stop: 06/26/20 19:41











- Objective


Vital Signs: 


                                   Vital Signs











Temperature  98.4 F   06/28/20 08:38


 


Pulse Rate  99 H  06/28/20 08:38


 


Respiratory Rate  20 06/28/20 08:38


 


Blood Pressure  103/57 L  06/28/20 08:38


 


O2 Sat by Pulse Oximetry (%)  94 L  06/27/20 20:41











Constitutional: Yes: No Distress, Pallor


HENT: Yes: WNL


Neck: Yes: WNL


Cardiovascular: Yes: WNL


Respiratory: Yes: SOB on Exertion


Gastrointestinal: Yes: WNL, Normal Bowel Sounds


...Rectal Exam: Yes: Deferred


Genitourinary: Yes: WNL


Breast(s): Yes: WNL


Musculoskeletal: Yes: WNL, Muscle Weakness


Edema: No


Peripheral Pulses WNL: Yes


Integumentary: Yes: WNL


Neurological: Yes: WNL


...Motor Strength: WNL


Psychiatric: Yes: WNL


Labs: 


                                    CBC, BMP





                                 06/28/20 05:25 





                                 06/28/20 05:25 





                                    INR, PTT











INR  1.18  (0.83-1.09)  H  06/26/20  13:25    














Assessment/Plan





w/u anemia


mrcp


gi to see pt


agree w pulm tx


?xiffan po for ?ibs d


dm good control no need for meds yet


leukopenia concern losswt ? occult malignancy entertaned??

## 2020-06-28 NOTE — CON.GI
Consult


Consult Specialty:: Gastroenterology ( covering Dr. Silva) 


Referred by:: Dr Canelo Jacob


Reason for Consultation:: Diarrhea, CBD stone on CT





- History of Present Illness


Chief Complaint: SOB, weakness, anorexia , wt loss and diarrhea


History of Present Illness: 





60M is admitted with weakness, SOB, dizzyness, weight loss, anorexia and 

diarrhea. A CT suggest possible CBD stones but his LFTs do not suggest 

obstruction and he denies biliary colic. He has lost about 23 lbs within the 

last 2 weeks with dysgeusia and loss of appetite.  The CT also reveals ground 

glass infiltrates but his COVID antibody is negative. He reports a change in 

bowel habits that dates back to March when he developed diarrhea. with crampy 

lower abdominal pain. No bleeding. Prior to that he would move his bowels daily.

He denies foreign travel, exposure to antibiotics or changes in medications 

prior to the diarrhea onset. He received a course of Xifaxan and his diarrhea 

resolved for 2 weeks. It recurred in the form of loose or liquidy BMs once every

2-3 days. No further cramps. No blood or mucus passage. He denies tenesmus. The 

diarrhea persists despite Metformin being stopped. He admits to a fiber poor 

diet. He has previous intended weight loss to try to get his diabetes under 

control. He had a colonoscopy about 5 years ago which he believes was normal. He

has never had an EGD. He denies any FH of GI cancer or IBD. He had hepatitis A 

as a teenager. He is a recovering alcoholic x 30 years when he also stopped 

crack and cocaine usage ( no IVDA).        


In  while under the influence of cocaine he feel out of a window and cavanaugh

stained head trauma with prolonged intubation that led to tracheal stenosis and 

the subsequent need to remove a tracheal ring. He has chronic hoarseness. He 

also had an exploratory laparotomy to evaluate large hematoma and to fix a 

fracture of his pelvis with hardware and a mesh repair of his ventral hernia. He

had a previous open cholecystectomy.





- History Source


History Provided By: Patient


Limitations to Obtaining History: No Limitations





- Past Medical History


CNS: Yes: Peripheral Neuropathy (LLE post traumatic   neuropathy and foot 

drop), Seizure (suffered with grand mal seizures while using cocaine), Other 

(Head trauma with prolonged coma after falling out of window  while on 

cocaine )


Cardio/Vascular: Yes: HTN


Pulmonary: Yes: COPD


Hepatobiliary: Yes: Choledocholithiasis (by CT), Hepatitis A


Endocrine: Yes: Diabetes Mellitus





- Past Surgical History


Past Surgical History: Yes: Cholecystectomy (open), Colonoscopy, Hernia Repair 

(mesh repair of laparotomy incision)


Additional Surgical History: -  exploratory laparotomy at [facility] after falling out of window on cocaine. He had pelvic fracture repaired

with hardware and evaluation of hematoma. He does not believe that any organs 

were removed.  - tracheal ring excision following prolonged intubation stricture





- Alcohol/Substance Use


Hx Alcohol Use: Yes (recovering alcoholic x 30 years)


History of Substance Use: reports: Cocaine (snorted crack cocaine until 30 years

ago, no IVDA)





- Smoking History


Smoking history: Never smoked


Have you smoked in the past 12 months: No





- Social History


Usual Living Arrangement: With Spouse


ADL: Independent


Occupation: retired  [employer] employee


Place of Birth: [place]


History of Recent Travel: No





Home Medications





- Allergies


Allergies/Adverse Reactions: 


                                    Allergies











Allergy/AdvReac Type Severity Reaction Status Date / Time


 


No Known Allergies Allergy   Verified 20 12:49














- Home Medications


Home Medications: 


Ambulatory Orders





Atorvastatin Ca [Lipitor] 20 mg PO HS 20 


Ferrous Sulfate [Iron] 325 mg PO DAILY 20 


Lisinopril [Prinivil] 10 mg PO DAILY 20 


Budesonide/Formeterol Fumarate [SYMBICORT 160/4.5mcg -] 1 inh PO BID 20 











Family Medical History


Family Hx Cancer: Mother ( of lung cancer in her 70s)





Review of Systems





- Review of Systems


Constitutional: reports: Loss of Appetite, Unintentional Wgt. Loss, Weakness, 

Other (dysgeusia)


Eyes: reports: No Symptoms


HENT: reports: No Symptoms


Neck: reports: No Symptoms


Cardiovascular: reports: Chest Pain, Shortness of Breath


Respiratory: reports: Exercise Intolerance


Gastrointestinal: reports: Diarrhea


Genitourinary: reports: No Symptoms


Neurological: reports: Pre-Existing Deficit (LLE neuropathy with foot drop after

 trauma falling out of a window on crack), Seizure





Physical Exam-GI


Vital Signs: 


                                   Vital Signs











Temperature  98.4 F   20 08:38


 


Pulse Rate  99 H  20 08:38


 


Respiratory Rate  20   20 09:00


 


Blood Pressure  103/57 L  20 08:38


 


O2 Sat by Pulse Oximetry (%)  96   06/28/20 09:00








CBC,CMP











WBC  2.9 K/mm3 (4.0-10.0)  L  20  05:25    


 


RBC  3.55 M/mm3 (4.00-5.60)  L  20  05:25    


 


Hgb  9.5 GM/dL (11.7-16.9)  L  20  05:25    


 


Hct  28.7 % (35.4-49)  L  20  05:25    


 


MCV  80.8 fl (80-96)   20  05:25    


 


MCH  26.7 pg (25.7-33.7)   20  05:25    


 


MCHC  33.0 g/dl (32.0-35.9)   20  05:25    


 


RDW  15.9 % (11.9-15.9)   20  05:25    


 


Plt Count  209 K/MM3 (134-434)   20  05:25    


 


MPV  8.1 fl (7.5-11.1)   20  05:25    


 


Absolute Neuts (auto)  2.4 K/mm3 (1.5-8.0)   20  05:25    


 


Neutrophils %  84.7 % (42.8-82.8)  H  20  05:25    


 


Lymphocytes %  7.2 % (8-40)  L  20  05:25    


 


Monocytes %  4.4 % (3.8-10.2)   20  05:25    


 


Eosinophils %  2.5 % (0-4.5)   20  05:25    


 


Basophils %  1.2 % (0-2.0)   20  05:25    


 


Nucleated RBC %  0 % (0-0)   20  05:25    


 


ESR  74 mm/hr (0-20)  H  20  12:17    


 


Sodium  139 mmol/L (136-145)   20  05:25    


 


Potassium  5.1 mmol/L (3.5-5.1)   20  05:25    


 


Chloride  111 mmol/L ()  H  20  05:25    


 


Carbon Dioxide  22 mmol/L (21-32)   20  05:25    


 


Anion Gap  6 MMOL/L (8-16)  L  20  05:25    


 


BUN  25.8 mg/dL (7-18)  H  20  05:25    


 


Creatinine  1.1 mg/dL (0.55-1.3)   20  05:25    


 


Est GFR (CKD-EPI)AfAm  85.31   20  05:25    


 


Est GFR (CKD-EPI)NonAf  73.61   20  05:25    


 


POC Glucometer  75 UNITS ()   20  17:09    


 


Random Glucose  78 mg/dL ()   20  05:25    


 


Hemoglobin A1c %  5.9 % (4.2-6.3)   20  21:35    


 


Lactic Acid  1.2 mmol/L (0.4-2.0)   20  11:56    


 


Calcium  8.0 mg/dL (8.5-10.1)  L  20  05:25    


 


Magnesium  2.1 mg/dL (1.8-2.4)   20  13:25    


 


Ferritin  1057.7 ng/ml (8-388)  H  20  06:23    


 


Total Bilirubin  0.8 mg/dL (0.2-1)   20  06:23    


 


AST  62 U/L (15-37)  H  20  06:23    


 


ALT  19 U/L (13-61)   20  06:23    


 


Alkaline Phosphatase  90 U/L ()   20  06:23    


 


LD Total  510 U/L ()  H  20  06:23    


 


Creatine Kinase  79 U/L ()   20  13:25    


 


Troponin I  < 0.02 ng/ml (0.00-0.05)   20  13:25    


 


C-Reactive Protein  4.4 MG/DL (0.00-0.3)  H  20  05:25    


 


B-Natriuretic Peptide  26.2 pg/ml (5-125)   20  13:25    


 


Total Protein  7.3 g/dl (6.4-8.2)   20  06:23    


 


Albumin  2.4 g/dl (3.4-5.0)  L  20  06:23    


 


Lipase  215 U/L (73393)   20  13:25    


 


CA 19-9 Antigen  Cancelled   20  11:00    








                               Current Medications











Generic Name Dose Route Start Last Admin





  Trade Name Freq  PRN Reason Stop Dose Admin


 


Acetaminophen  650 mg  20 05:54  20 12:05





  Tylenol -  PO   650 mg





  Q4H PRN   Administration





  FEVER  


 


Albuterol Sulfate  2 puff  20 18:49 





  Ventolin Hfa Inhaler -  IH  





  Q4H PRN  





  SHORT OF BREATH/WHEEZING  


 


Atorvastatin Calcium  20 mg  20 22:00 





  Lipitor -  PO  





  HS GABBY  


 


Azithromycin  500 mg  20 11:15  20 11:48





  Zithromax -  PO   500 mg





  DAILY GABBY   Administration


 


Budesonide/Formoterol Fumarate  2 puff  20 22:00  20 09:10





  Symbicort 80/4.5mcg -  IH   2 puff





  BID GABBY   Administration


 


Cholestyramine Resin  4 gm  20 10:00  20 11:01





  Questran Packet -  PO   4 gm





  BID GABBY   Administration


 


Enoxaparin Sodium  70 mg  20 11:29 





  Lovenox -  SQ  





  BID GABBY  


 


Dextrose/Sodium Chloride  1,000 mls @ 125 mls/hr  20 11:15  20 11:49





  D5-1/2ns -  IV   125 mls/hr





  ASDIR GABBY   Administration


 


Cefepime HCl 1 gm/ Dextrose  1 mls @ 2 mls/hr  20 14:00 





  IVPB  





  Q8H-IV GABBY  





  Protocol  


 


Methylprednisolone Sodium Succinate  40 mg  20 11:15  20 11:48





  Solu-Medrol -  IVPUSH   40 mg





  BID GABBY   Administration


 


Ondansetron HCl  4 mg  20 10:06 





  Zofran -  PO  20 10:05 





  Q8H PRN  





  DYSPEPSIA  


 


Pantoprazole Sodium  40 mg  20 10:00  20 09:10





  Protonix -  PO   40 mg





  DAILY GABBY   Administration


 


Pneumococcal 13-Valent Conj Vacc  0.5 ml  20 19:40 





  Prevnar 13 Syringe -  IM  20 19:41 





  .ONCE ONE  











Constitutional: Yes: Calm


Eyes: Yes: Conjunctiva Clear


HENT: Yes: Atraumatic


Neck: Yes: Trachea Midline, Other (healed low transverse incision)


Cardiovascular: Yes: Tachycardia


Respiratory: Yes: CTA Bilaterally


Gastrointestinal Inspection: Yes: Scars (healed oblique RUQ and distorted long 

vertical midline incision with palpable mesh)


...Auscultate: Yes: Normoactive Bowel Sounds


...Palpate: Yes: Soft, Other (nontender)


...Percussion: Yes: Tympanitic


...Rectal Exam: Yes: Guaiac Negative (semisolid brown guaiac negative stool), 

Other (1+ prostate)


Extremities: Yes: Other (LLE foot drop)


Edema: No


Peripheral Pulses WNL: Yes


Psychiatric: Yes: Alert, Oriented


Labs: 


                                    CBC, BMP





                                 20 05:25 





                                 20 05:25 





                                    INR, PTT











INR  1.18  (0.83-1.09)  H  20  13:25    








Microbiology





20 08:05   Stool   Clostridioides difficile Antigen - Final


20 08:05   Stool   Clostridioides difficile Toxin Assay - Final





                                Laboratory Tests











  16





  15:50 13:25 06:23


 


Ferritin    1057.7 H


 


AST  16  69 H  62 H


 


C-Reactive Protein    3.6 H














  20





  05:25


 


Ferritin 


 


AST 


 


C-Reactive Protein  4.4 H














Imaging





- Results


Cat Scan: Report Reviewed (         Final Report CT ABDOMEN & PELVIS CT WITH 

CONTR   Show Printer-Friendly Version    Patient Name: Canelo Jameson  : 

1960  ID: [id number]  Study Date: 2020 16:21        [facility]                                   Name: CANELO JAMESON  DEPARTMENT OF 

RADIOLOGY                           Phys: Tesha Pradhan RESIDENT            

                                       : 1960    Age: 60     Sex: M  [facility]                       Acct: [id number] Loc: [address]                                Exam Date: 20 Status: [place] [id number]                                  Unit Number: [id number]  

[id number]      ACCESSION #:  [id number]   [id number]    EXAM#:     

TYPE/EXAM:                          RESULT:   CT/ABDOMEN   PELVIS CT 

WITH CONTR    CT/CHEST CTA  Chest CT angiography     Clinical 

information: pulmonary embolism     Multiplanar imaging was performed following 

the intravenous administration of nonionic contrast.     Evaluation is limited d

ue to CT unit malfunction. There is no obvious central pulmonary embolus. No 

gross peripheral embolus is visualized.     In comparison to prior CT exams of 

2020 and 3/21/2019 interval development of bilateral upper  and lower lung 

field groundglass opacity is noted.     No alveolar infiltrate or pleural 

effusion is seen.     The trachea and central bronchi demonstrate no obvious 

pathology.     A stable noncalcified 0.3 cm right mid lung field pulmonary 

nodule is noted ventrally (transaxial  image 47).     There is no definite 

cardiac enlargement. No pericardial effusion is seen.     No discrete 

lymphadenopathy is noted.     No aortic aneurysm. Incidental note is made of a 

normal variant consisting of the left vertebral  artery originating from the 

aortic arch.     A stable T8 vertebral body sclerotic focus is noted probably 

representing a bone island.       Impression:     Limited exam as noted above.  

  No obvious central pulmonary embolism is seen. There is no gross peripheral 

embolism within the  limitations of the study. If there is ongoing clinical 

concern correlation with repeat CT  angiography and/or bilateral lower extremity

venous sonography may be considered.     In comparison to prior CT studies 

performed on 2020 and 3/21/2019 interval development of  bilateral upper 

and lower lung field groundglass opacities are noted. The radiological 

differential  diagnosis is very broad and would include etiologies such as 

atypical infections including viral  pneumonia, pneumocystis, atypical bacterial

infections such as Legionella, mycoplasma, and  chlamydia, pulmonary edema, 

pulmonary hemorrhage, aspiration, hypersensitivity pneumonitis,  nonspecific 

interstitial pneumonia, organizing pneumonia and much less likely invasive 

mucinous  adenocarcinoma.       ABDOMEN AND PELVIS CT (with contrast)     

Clinical information: sudden weight loss     Multiplanar imaging was performed 

probably intravenous administration of nonionic contrast. Enteric contrast was 

not administered.     No prior dedicated abdomen/pelvic imaging exam is 

available at this facility for direct comparison.     Two contiguous 0.2 cm 

calcific densities are noted along the posterior wall of the common bile duct at

the level of the lower third strongly suggestive of choledocholithiasis. Less 

likely these  calcifications may be extrinsic to the common bile duct. The 

common bile duct is slightly dilated  with a 0.7 cm diameter. The gallbladder is

not visualized.     No evidence of pneumoperitoneum, abscess, free 

intraperitoneal fluid or bowel obstruction.     A 1.4 x 0.8 cm left adrenal 

nodule is noted which is unchanged in comparison to a chest CT exam of  

2020. The adrenal glands are not well visualized on a 2019 chest CT exam 

due to partially  obscuring artifact.     There is diffuse fatty infiltration of

the liver. No obvious mass lesion is identified.     The spleen, pancreas, right

adrenal gland and kidneys demonstrate no discrete pathology. There is  no aortic

aneurysm. No definite lymphadenopathy is noted on the basis of size criteria.   

 The appendix is not definitely visualized. No obvious indirect CT signs of 

acute appendicitis are  noted. There is no CT evidence of acute diverticulitis 

or colitis. Mild to moderate colitis may not  be demonstrable on CT. No gross 

small bowel abnormality is identified allowing for lack of  intraluminal 

contrast.     Mild prostate enlargement. The seminal vesicles appear symmetric. 

The urinary bladder is partially  obscured due to metallic artifact arising from

surgical instrumentation within the region of the  symphysis pubis and left 

posterior pelvis.       Impression:     Probable choledocholithiasis is noted as

discussed above. The common bile duct is slightly dilated  with a 0.7 cm 

diameter. The gallbladder is nonvisualized - ? prior surgery.     Postsurgical 

changes are seen along the anterior abdominal wall probably on the basis of 

hernia  repair.     Diffuse hepatic steatosis.     1.4 x 0.8 cm left adrenal 

nodule which is unchanged in comparison to a chest CT exam of 2020.  On a 

statistical basis this adenoma very likely represents an adenoma. Biochemical 

evaluation is  suggested as well as 6 month follow-up MRI or CT.                

                                Reported By: Efrain Vogt MD                 

                            20    Tesha Pradhan

chnologist: Amilcar Goldstein  Transcribed Date/Time: 20  

: Efrain Vogt  Printed Date/Time:     By:      Signed by: 

Efrain Vogt    Signed on: 2020 17:37)





Problem List





- Problems


(1) Diarrhea


Problems reviewed: Yes   


Code(s): R19.7 - DIARRHEA, UNSPECIFIED   





(2) Choledocholithiasis


Problems reviewed: Yes   


Code(s): K80.50 - CALCULUS OF BILE DUCT W/O CHOLANGITIS OR CHOLECYST W/O OBST   





(3) Abnormal liver enzymes


Problems reviewed: Yes   


Code(s): R74.8 - ABNORMAL LEVELS OF OTHER SERUM ENZYMES   





(4) Altered bowel habits


Problems reviewed: Yes   


Code(s): R19.4 - CHANGE IN BOWEL HABIT   





(5) Diabetes mellitus


Code(s): E11.9 - TYPE 2 DIABETES MELLITUS WITHOUT COMPLICATIONS   





(6) Fatty liver


Problems reviewed: Yes   


Code(s): K76.0 - FATTY (CHANGE OF) LIVER, NOT ELSEWHERE CLASSIFIED   





(7) Recovering alcoholic in remission


Problems reviewed: Yes   


Code(s): F10.21 - ALCOHOL DEPENDENCE, IN REMISSION   





(8) Cocaine abuse in remission


Code(s): F14.11 - COCAINE ABUSE, IN REMISSION   





(9) Dysgeusia


Problems reviewed: Yes   


Code(s): R43.2 - PARAGEUSIA   





(10) Ground glass opacity present on imaging of lung


Code(s): R91.8 - OTHER NONSPECIFIC ABNORMAL FINDING OF LUNG FIELD   





(11) Anemia


Problems reviewed: Yes   


Code(s): D64.9 - ANEMIA, UNSPECIFIED   





(12) Suspected COVID-19 virus infection


Code(s): Z20.828 - CONTACT W AND EXPOSURE TO OTH VIRAL COMMUNICABLE DISEASES   





(13) Weight loss


Code(s): R63.4 - ABNORMAL WEIGHT LOSS   





(14) HTN (hypertension)


Code(s): I10 - ESSENTIAL (PRIMARY) HYPERTENSION   


Qualifiers: 


   Hypertension type: essential hypertension   Qualified Code(s): I10 - 

Essential (primary) hypertension   





Assessment/Plan





Impression: 


- Given the prolonged alteration of bowel habits associated with 23 lbs weight 

loss and anemia I have told Canelo that he will likely need a repeat colonoscopy

to exclude an underlying partially obstructing neoplasm. I will screen for 

infectious etiologies ( C diff excluded) and celiac disease. His diabetic 

autonomic neuropathy predisposes him to SIBO ( small intestinal bacterial 

overgrowth syndrome) and diabetic diarrhea.    


- The abnormal CT finding of choledocholithiasis requires further evaluation 

with MRCP. His LFTs are not obstructive and likely reflect ZUNIGA or residual 

alcoholic liver disease as is supported by his fatty liver.  





Plan:


-- Await MRCP


-- Stool for WBCs and enteric pathogens


-- Celiac panel, VIP level, elastase and fecal calprotectin levels ordered as 

well as IBD panel


-- Will stop cholestyramine until these studies and colonoscopy are done. His 

choleccystectomy was done remotely and he does not have overwhelming diarrhea 

and he can go 2-3 days without a BM. 


-- Will screen for chronic liver diseases and HBV vaccination status





 Dr Ricardo flores return tomorrow and assume GI care for this patient

## 2020-06-28 NOTE — PN
Progress Note (short form)





- Note


Progress Note: 





PULMONARY





SPIKING TEMPS/NOW WITH SHAKING CHILLS


TACHYPNEA AND TACHYCARDIA UPON MINIMAL EXERTION


NO COUGH





COVID IGG NEGATIVE


COVID SWAB PENDING


CT CHEST GGO B/L


ELEVATED CRP/FERRITIN/LDH/D-DIMER


C.DIFF NEGATIVE


DECREASING WBC/HGB  ?SEPSIS


DIARRHEA REPORTED BY PATIENT


PATIENT DID NOT HAVE SPLENOMEGALY AS PREVIOUSLY REPORTED








HIGH INDEX OF SUSPICION FOR COVID-19 WITH RESP/GI SYMPTOMS





SPO2 97% AT REST ON 2L/M NASAL


RECTAL TEMP  103/57 





ANICTERIC


FINE CRACKLES BILATERAL


S1S2


HYPOACTIVE BOWEL SOUNDS/NO REBOUND/NO TENDERNESS/NO GUARDING


NO EDEMA








CHANGE IV TO D5 1/2 PI079IS/HR


SOLUMEDROL 40MG BID


LOVENOX 1MG/KG BID


INCREASE O2 TO 40% V/M


ZITHROMAX 500MG OD 


CHECK COVID SWAB STILL PENDING





SUGGEST GI REFERRAL FOR REPORTED CHOLEDOCHOLITHIASIS





CONDITION GUARDED


CONTINUE TELE


WILL DISCUSS WITH ATTENDING








ANJU DEVINE MD





Problem List





- Problems


(1) Choledocholithiasis


Code(s): K80.50 - CALCULUS OF BILE DUCT W/O CHOLANGITIS OR CHOLECYST W/O OBST   





(2) Suspected COVID-19 virus infection


Code(s): Z20.828 - CONTACT W AND EXPOSURE TO OTH VIRAL COMMUNICABLE DISEASES   





(3) Pneumonitis


Code(s): J18.9 - PNEUMONIA, UNSPECIFIED ORGANISM   





(4) Weight loss


Code(s): R63.4 - ABNORMAL WEIGHT LOSS   





(5) Anemia


Code(s): D64.9 - ANEMIA, UNSPECIFIED   





(6) Foot drop


Code(s): M21.379 - FOOT DROP, UNSPECIFIED FOOT   





(7) Diarrhea


Code(s): R19.7 - DIARRHEA, UNSPECIFIED

## 2020-06-29 LAB
ALBUMIN SERPL-MCNC: 2.1 G/DL (ref 3.4–5)
ALP SERPL-CCNC: 72 U/L (ref 45–117)
ALT SERPL-CCNC: 14 U/L (ref 13–61)
AMYLASE SERPL-CCNC: 81 U/L (ref 25–115)
ANION GAP SERPL CALC-SCNC: 8 MMOL/L (ref 8–16)
ANISOCYTOSIS BLD QL: (no result)
AST SERPL-CCNC: 54 U/L (ref 15–37)
BILIRUB SERPL-MCNC: 0.4 MG/DL (ref 0.2–1)
BUN SERPL-MCNC: 20.8 MG/DL (ref 7–18)
CALCIUM SERPL-MCNC: 8.2 MG/DL (ref 8.5–10.1)
CHLORIDE SERPL-SCNC: 108 MMOL/L (ref 98–107)
CO2 SERPL-SCNC: 22 MMOL/L (ref 21–32)
CREAT SERPL-MCNC: 1 MG/DL (ref 0.55–1.3)
DACRYOCYTES BLD QL SMEAR: (no result)
DEPRECATED RDW RBC AUTO: 15.7 % (ref 11.9–15.9)
GLUCOSE SERPL-MCNC: 273 MG/DL (ref 74–106)
HCT VFR BLD CALC: 28.6 % (ref 35.4–49)
HGB BLD-MCNC: 9.5 GM/DL (ref 11.7–16.9)
LIPASE SERPL-CCNC: 409 U/L (ref 73–393)
MACROCYTES BLD QL: 0
MCH RBC QN AUTO: 27 PG (ref 25.7–33.7)
MCHC RBC AUTO-ENTMCNC: 33.1 G/DL (ref 32–35.9)
MCV RBC: 81.6 FL (ref 80–96)
OVALOCYTES BLD QL SMEAR: (no result)
PLATELET # BLD AUTO: 216 K/MM3 (ref 134–434)
PLATELET BLD QL SMEAR: NORMAL
PMV BLD: 8.6 FL (ref 7.5–11.1)
POTASSIUM SERPLBLD-SCNC: 4.6 MMOL/L (ref 3.5–5.1)
PROT SERPL-MCNC: 6.8 G/DL (ref 6.4–8.2)
RBC # BLD AUTO: 3.5 M/MM3 (ref 4–5.6)
RETICS # AUTO: 0.61 % (ref 0.5–1.5)
SODIUM SERPL-SCNC: 137 MMOL/L (ref 136–145)
WBC # BLD AUTO: 0.9 K/MM3 (ref 4–10)

## 2020-06-29 RX ADMIN — ATORVASTATIN CALCIUM SCH MG: 20 TABLET, FILM COATED ORAL at 21:19

## 2020-06-29 RX ADMIN — BUDESONIDE AND FORMOTEROL FUMARATE DIHYDRATE SCH PUFF: 80; 4.5 AEROSOL RESPIRATORY (INHALATION) at 10:16

## 2020-06-29 RX ADMIN — METHYLPREDNISOLONE SODIUM SUCCINATE SCH MG: 40 INJECTION, POWDER, FOR SOLUTION INTRAMUSCULAR; INTRAVENOUS at 21:19

## 2020-06-29 RX ADMIN — PANTOPRAZOLE SODIUM SCH MG: 40 TABLET, DELAYED RELEASE ORAL at 10:16

## 2020-06-29 RX ADMIN — METHYLPREDNISOLONE SODIUM SUCCINATE SCH MG: 40 INJECTION, POWDER, FOR SOLUTION INTRAMUSCULAR; INTRAVENOUS at 10:16

## 2020-06-29 RX ADMIN — CEFEPIME HYDROCHLORIDE SCH MLS/HR: 1 INJECTION, POWDER, FOR SOLUTION INTRAMUSCULAR; INTRAVENOUS at 03:33

## 2020-06-29 RX ADMIN — SODIUM CHLORIDE SCH MLS/HR: 9 INJECTION, SOLUTION INTRAVENOUS at 10:15

## 2020-06-29 RX ADMIN — INSULIN ASPART SCH: 100 INJECTION, SOLUTION INTRAVENOUS; SUBCUTANEOUS at 12:03

## 2020-06-29 RX ADMIN — ENOXAPARIN SODIUM SCH MG: 80 INJECTION SUBCUTANEOUS at 21:19

## 2020-06-29 RX ADMIN — SODIUM CHLORIDE SCH MLS/HR: 9 INJECTION, SOLUTION INTRAVENOUS at 23:22

## 2020-06-29 RX ADMIN — SULFAMETHOXAZOLE AND TRIMETHOPRIM SCH EACH: 800; 160 TABLET ORAL at 11:56

## 2020-06-29 RX ADMIN — AZITHROMYCIN SCH MG: 250 TABLET, FILM COATED ORAL at 10:16

## 2020-06-29 RX ADMIN — INSULIN ASPART SCH UNITS: 100 INJECTION, SOLUTION INTRAVENOUS; SUBCUTANEOUS at 11:53

## 2020-06-29 RX ADMIN — CEFEPIME HYDROCHLORIDE SCH MLS/HR: 1 INJECTION, POWDER, FOR SOLUTION INTRAMUSCULAR; INTRAVENOUS at 17:54

## 2020-06-29 RX ADMIN — BUDESONIDE AND FORMOTEROL FUMARATE DIHYDRATE SCH PUFF: 80; 4.5 AEROSOL RESPIRATORY (INHALATION) at 21:20

## 2020-06-29 RX ADMIN — CEFEPIME HYDROCHLORIDE SCH MLS/HR: 1 INJECTION, POWDER, FOR SOLUTION INTRAMUSCULAR; INTRAVENOUS at 10:14

## 2020-06-29 RX ADMIN — INSULIN ASPART SCH: 100 INJECTION, SOLUTION INTRAVENOUS; SUBCUTANEOUS at 16:52

## 2020-06-29 RX ADMIN — INSULIN ASPART SCH: 100 INJECTION, SOLUTION INTRAVENOUS; SUBCUTANEOUS at 21:31

## 2020-06-29 RX ADMIN — ENOXAPARIN SODIUM SCH MG: 80 INJECTION SUBCUTANEOUS at 10:15

## 2020-06-29 NOTE — PN
Progress Note (short form)





- Note


Progress Note: 





PULMONARY





Prelim HIV positive. Still with dyspnea on exertion. +cough with minimal sputum.





                                   Vital Signs











 Period  Temp  Pulse  Resp  BP Sys/Carvajal  Pulse Ox


 


 Last 24 Hr  97.2 F-99.3 F    20-20  116-128/61-76  99-99








Gen:  NAD at rest


Heart: RRR


Lung: basilar rales


Abd: soft, nontender


Ext: no edema





                                    CBC, BMP





                                 06/29/20 05:47 





                                 06/29/20 06:00 





Active Medications





Acetaminophen (Tylenol -)  650 mg PO Q4H PRN


   PRN Reason: FEVER


   Last Admin: 06/28/20 12:05 Dose:  650 mg


   Documented by: 


Albuterol Sulfate (Ventolin Hfa Inhaler -)  2 puff IH Q4H PRN


   PRN Reason: SHORT OF BREATH/WHEEZING


Atorvastatin Calcium (Lipitor -)  20 mg PO HS [facility]


   Last Admin: 06/28/20 21:47 Dose:  20 mg


   Documented by: 


Azithromycin (Zithromax -)  500 mg PO DAILY [facility]


   Last Admin: 06/29/20 10:16 Dose:  500 mg


   Documented by: 


Budesonide/Formoterol Fumarate (Symbicort 80/4.5mcg -)  2 puff IH BID [facility]


   Last Admin: 06/29/20 10:16 Dose:  2 puff


   Documented by: 


Enoxaparin Sodium (Lovenox -)  70 mg SQ BID [facility]


   Last Admin: 06/29/20 10:15 Dose:  70 mg


   Documented by: 


Cefepime HCl 1 gm/ Dextrose  1 mls @ 2 mls/hr IVPB Q8H-IV GABBY; Protocol


   Last Admin: 06/29/20 10:14 Dose:  2 mls/hr


   Documented by: 


Sodium Chloride (Normal Saline -)  1,000 mls @ 100 mls/hr IV ASDIR [facility]


   Last Admin: 06/29/20 10:15 Dose:  100 mls/hr


   Documented by: 


Insulin Aspart (Novolog Vial Sliding Scale -)  1 vial SQ ACHS [facility]; Protocol


   Last Admin: 06/29/20 11:53 Dose:  2 units


   Documented by: 


Methylprednisolone Sodium Succinate (Solu-Medrol -)  40 mg IVPUSH BID [facility]


   Last Admin: 06/29/20 10:16 Dose:  40 mg


   Documented by: 


Ondansetron HCl (Zofran -)  4 mg PO Q8H PRN


   PRN Reason: DYSPEPSIA


   Stop: 07/03/20 10:05


Pantoprazole Sodium (Protonix -)  40 mg PO DAILY [facility]


   Last Admin: 06/29/20 10:16 Dose:  40 mg


   Documented by: 


Trimethoprim/Sulfamethoxazole (Bactrim Ds -)  1 each PO [id number] [facility]


   Last Admin: 06/29/20 11:56 Dose:  1 each


   Documented by: 





A/P


r/o HIV


Suspect PCP Pneumonia


Leukopenia


COPD


HTN


Hyperlipidemia





-  sputum cytology to look for pneumocystis


-  O2 to keep SpO2 >90%


-  antibiotics per ID


-  if unable to produce sputum, will perform bronchoscopy for BAL


-  DVT prophylaxis

## 2020-06-29 NOTE — ECHO
______________________________________________________________________________



Name: WILL COTA                                  Exam:Adult Echocardiogram

MRN: [id number]         Study Date: 2020 08:51 AM

Age: 60 yrs

______________________________________________________________________________



Reason For Study: SOB

Height: 68 in        Weight: 160 lb        BSA: 1.9 m2



______________________________________________________________________________



MMode/2D Measurements & Calculations

IVSd: 1.3 cm                                            Ao root diam: 2.5 cm

LVIDd: 3.6 cm                                           LA dimension: 2.5 cm

LVIDs: 2.5 cm

LVPWd: 1.0 cm



_________________________________________________________

EDV(Teich): 53.2 ml                                     LVOT diam: 2.0 cm

ESV(Teich): 22.5 ml



_________________________________________________________

LAV (MOD-bp): 30.5 ml



Doppler Measurements & Calculations

MV E max hans: 64.3 cm/sec                                  Ao V2 max: 116.7 cm/sec

MV A max hans: 78.3 cm/sec                                  Ao max P.5 mmHg

MV E/A: 0.82

MV dec time: 0.15 sec                                      MELVINA(V,D): 2.6 cm2



____________________________________________________________

LV V1 max P.2 mmHg                                     PA V2 max: 93.7 cm/sec

LV V1 max: 102.2 cm/sec                                    PA max PG: 3.5 mmHg



____________________________________________________________

Med Peak E' Hans: 8.7 cm/sec                                PI Vmax: 133.0 cm/sec

Med E/e': 7.4

Lat Peak E' Hans: 8.9 cm/sec

Lat E/e': 7.2





______________________________________________________________________________

Procedure

Study Quality: Fair.

Left Ventricle

The left ventricle is normal in size. The left ventricular ejection fraction is normal. Ejection Frac
tion =

60-65%. The transmitral spectral Doppler flow pattern is suggestive of impaired LV relaxation.

Right Ventricle

The right ventricle is normal in size and function.

Atria

Normal left and right atrial size and function.

Mitral Valve

The mitral valve leaflets appear normal. There is no evidence of stenosis, fluttering, or prolapse. T
here is

no mitral regurgitation noted.

Tricuspid Valve

The tricuspid valve is normal. No tricuspid regurgitation.

Aortic Valve

The aortic valve is not well visualized. No hemodynamically significant valvular aortic stenosis. No 
aortic

regurgitation is present.

Pulmonic Valve

The pulmonic valve is not well seen, but is grossly normal. There is no pulmonic valvular regurgitati
on.

Great Vessels

The aortic root is normal size.

Pericardium/Pleura

There is no pericardial effusion.

______________________________________________________________________________





Interpretation Summary

LV: Normal size, mild septal hypertrophy, normal systolic function EFV 60-65%, impaired relaxation

RV; Normal

No significant valvular dysfunction.





Matt Mayen 2020 09:44 AM

## 2020-06-29 NOTE — PN
Progress Note, Physician


Chief Complaint: 





sweating at night


no abd symptoms


vss


spoke to pt staTES HAD SEX 2 YRS AGO ? NOPROTECTION


HAS H/O COCAINE IV ABUSE


ALISHA PRELIMONARY TEST FOR HIV POS








- Current Medication List


Current Medications: 


Active Medications





Acetaminophen (Tylenol -)  650 mg PO Q4H PRN


   PRN Reason: FEVER


   Last Admin: 06/28/20 12:05 Dose:  650 mg


   Documented by: 


Albuterol Sulfate (Ventolin Hfa Inhaler -)  2 puff IH Q4H PRN


   PRN Reason: SHORT OF BREATH/WHEEZING


Atorvastatin Calcium (Lipitor -)  20 mg PO HS [facility]


   Last Admin: 06/28/20 21:47 Dose:  20 mg


   Documented by: 


Azithromycin (Zithromax -)  500 mg PO DAILY [facility]


   Last Admin: 06/29/20 10:16 Dose:  500 mg


   Documented by: 


Budesonide/Formoterol Fumarate (Symbicort 80/4.5mcg -)  2 puff IH BID [facility]


   Last Admin: 06/29/20 10:16 Dose:  2 puff


   Documented by: 


Enoxaparin Sodium (Lovenox -)  70 mg SQ BID [facility]


   Last Admin: 06/29/20 10:15 Dose:  70 mg


   Documented by: 


Cefepime HCl 1 gm/ Dextrose  1 mls @ 2 mls/hr IVPB Q8H-IV [facility]; Protocol


   Last Admin: 06/29/20 10:14 Dose:  2 mls/hr


   Documented by: 


Sodium Chloride (Normal Saline -)  1,000 mls @ 100 mls/hr IV ASDIR [facility]


   Last Admin: 06/29/20 10:15 Dose:  100 mls/hr


   Documented by: 


Insulin Aspart (Novolog Vial Sliding Scale -)  1 vial SQ ACHS [facility]; Protocol


   Last Admin: 06/29/20 12:03 Dose:  Not Given


   Documented by: 


Methylprednisolone Sodium Succinate (Solu-Medrol -)  40 mg IVPUSH BID [facility]


   Last Admin: 06/29/20 10:16 Dose:  40 mg


   Documented by: 


Ondansetron HCl (Zofran -)  4 mg PO Q8H PRN


   PRN Reason: DYSPEPSIA


   Stop: 07/03/20 10:05


Pantoprazole Sodium (Protonix -)  40 mg PO DAILY [facility]


   Last Admin: 06/29/20 10:16 Dose:  40 mg


   Documented by: 


Trimethoprim/Sulfamethoxazole (Bactrim Ds -)  1 each PO MoWeFr@1000 [facility]


   Last Admin: 06/29/20 11:56 Dose:  1 each


   Documented by: 











- Objective


Vital Signs: 


                                   Vital Signs











Temperature  98 F   06/29/20 13:57


 


Pulse Rate  80   06/29/20 13:57


 


Respiratory Rate  20   06/29/20 13:57


 


Blood Pressure  151/51 L  06/29/20 13:57


 


O2 Sat by Pulse Oximetry (%)  97   06/29/20 10:00











Constitutional: Yes: Calm


Eyes: Yes: WNL


HENT: Yes: WNL


Neck: Yes: WNL


Cardiovascular: Yes: WNL


Respiratory: Yes: SOB on Exertion


...Rectal Exam: Yes: Deferred


Genitourinary: Yes: WNL


Breast(s): Yes: WNL


Musculoskeletal: Yes: WNL


Extremities: Yes: WNL


Edema: No


Peripheral Pulses WNL: Yes


Integumentary: Yes: WNL


Neurological: Yes: WNL


...Motor Strength: WNL


Psychiatric: Yes: WNL


Labs: 


                                    CBC, BMP





                                 06/29/20 05:47 





                                 06/29/20 06:00 





                                    INR, PTT











INR  1.18  (0.83-1.09)  H  06/26/20  13:25    














Assessment/Plan





OUT PT TX FOR HIV


AGREE SPUTUM


?PPD QUNTAFERON BETTER DUE LEUKOPENIA MYCOBACTERIUM PELON


CHK LABS IN AM

## 2020-06-29 NOTE — CON.HO
Consult


Consult Specialty:: Hematology


Reason for Consultation:: Anemia





- History of Present Illness


History of Present Illness: 





60M is admitted with weakness, SOB, dizzyness, weight loss, anorexia and 

diarrhea. He has been in reasonable health; he has been having diarrhea for last

3 months and has lost about 60 pounds. Otherwise, he has only DM as well as 

chronic foot drop. He used to drink alcohol but stopped 30 years ago. Never was 

toild of anemia in the past and has no FH of anemia. Nonsmoker.





WBC today is 2.9, Glynn 9.5, plt 216. Cr 1,1m Retic 0.61.CT shows spleen normal. GI

saw and plans colonoscopy for possible blood loss. HIV today preliminary +, and 

the patient had been told of it.





- Past Medical History


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 

drop), Seizure (suffered with grand mal seizures while using cocaine), Other 

(Head trauma with prolonged coma after falling out of window 1986 while on 

cocaine )


Cardio/Vascular: Yes: HTN


Pulmonary: Yes: COPD


Hepatobiliary: Yes: Choledocholithiasis (by CT), Hepatitis A


Endocrine: Yes: Diabetes Mellitus





- Past Surgical History


Past Surgical History: Yes: Cholecystectomy (open), Colonoscopy, Hernia Repair 

(mesh repair of laparotomy incision)


Additional Surgical History: - 1986 exploratory laparotomy at [facility] after falling out of window on cocaine. He had pelvic fracture repaired

with hardware and evaluation of hematoma. He does not believe that any organs w

ere removed.  - tracheal ring excision following prolonged intubation stricture





- Alcohol/Substance Use


Hx Alcohol Use: Yes (recovering alcoholic x 30 years)


History of Substance Use: reports: Cocaine (snorted crack cocaine until 30 years

ago, no IVDA)





- Smoking History


Smoking history: Former smoker


Have you smoked in the past 12 months: No





- Social History


Usual Living Arrangement: With Spouse


ADL: Independent


Occupation: retired  phone company employee


History of Recent Travel: No





Home Medications





- Allergies


Allergies/Adverse Reactions: 


                                    Allergies











Allergy/AdvReac Type Severity Reaction Status Date / Time


 


No Known Allergies Allergy   Verified 06/26/20 12:49














- Home Medications


Home Medications: 


Ambulatory Orders





Atorvastatin Ca [Lipitor] 20 mg PO HS 06/26/20 


Ferrous Sulfate [Iron] 325 mg PO DAILY 06/26/20 


Lisinopril [Prinivil] 10 mg PO DAILY 06/26/20 


Budesonide/Formeterol Fumarate [SYMBICORT 160/4.5mcg -] 1 inh PO BID 06/27/20 











Physical Exam


Vital Signs: 





                                   Vital Signs











Temperature  97.9 F   06/29/20 09:07


 


Pulse Rate  77   06/29/20 09:07


 


Respiratory Rate  20 06/29/20 09:07


 


Blood Pressure  118/72   06/29/20 09:07


 


O2 Sat by Pulse Oximetry (%)  99   06/28/20 22:42











Gastrointestinal: Yes: Splenomegaly (I palpate the splenic edge just under brandan 

costal margin.)


Labs: 





                                    CBC, BMP





                                 06/29/20 05:47 





                                 06/29/20 06:00 











Assessment/Plan





Anemia - can check iron studies,B12, Folate, but looks more like anemia of HIV 

and some mild splenomegaly. I palpate the enlarged spleen although radiological 

studies do not confirm and platelets are normal, bu WBC also decreased. I will 

check erytropoietin and can consider Procrit, if low.

## 2020-06-29 NOTE — PN
Progress Note (short form)





- Note


Progress Note: 





CHART REVIWED, patient was admitted with SOB and diarrhea. He denies abdominal 

pain nausea and vomiting. CT revealed an incidental finding of CBD stone.His 

hospital course revealed improvement of his diarrhea but continued to have 

persistent SOB. COVID test is pending, possible HIV infection. He was noted to 

be neutropenic





R> await stool examination


   GI w/u on hold


   await COVID testing


d/w Dr Canelo Jacob

## 2020-06-29 NOTE — PN
Progress Note, Physician


History of Present Illness: 





patient stable


no issues breathing


hiv positive 


neutropenic





- Current Medication List


Current Medications: 


Active Medications





Acetaminophen (Tylenol -)  650 mg PO Q4H PRN


   PRN Reason: FEVER


   Last Admin: 06/28/20 12:05 Dose:  650 mg


   Documented by: 


Albuterol Sulfate (Ventolin Hfa Inhaler -)  2 puff IH Q4H PRN


   PRN Reason: SHORT OF BREATH/WHEEZING


Atorvastatin Calcium (Lipitor -)  20 mg PO HS [facility]


   Last Admin: 06/28/20 21:47 Dose:  20 mg


   Documented by: 


Azithromycin (Zithromax -)  500 mg PO DAILY [facility]


   Last Admin: 06/29/20 10:16 Dose:  500 mg


   Documented by: 


Budesonide/Formoterol Fumarate (Symbicort 80/4.5mcg -)  2 puff IH BID [facility]


   Last Admin: 06/29/20 10:16 Dose:  2 puff


   Documented by: 


Enoxaparin Sodium (Lovenox -)  70 mg SQ BID [facility]


   Last Admin: 06/29/20 10:15 Dose:  70 mg


   Documented by: 


Cefepime HCl 1 gm/ Dextrose  1 mls @ 2 mls/hr IVPB Q8H-IV [facility]; Protocol


   Last Admin: 06/29/20 10:14 Dose:  2 mls/hr


   Documented by: 


Sodium Chloride (Normal Saline -)  1,000 mls @ 100 mls/hr IV ASDIR [facility]


   Last Admin: 06/29/20 10:15 Dose:  100 mls/hr


   Documented by: 


Insulin Aspart (Novolog Vial Sliding Scale -)  1 vial SQ ACHS [facility]; Protocol


Methylprednisolone Sodium Succinate (Solu-Medrol -)  40 mg IVPUSH BID [facility]


   Last Admin: 06/29/20 10:16 Dose:  40 mg


   Documented by: 


Ondansetron HCl (Zofran -)  4 mg PO Q8H PRN


   PRN Reason: DYSPEPSIA


   Stop: 07/03/20 10:05


Pantoprazole Sodium (Protonix -)  40 mg PO DAILY [facility]


   Last Admin: 06/29/20 10:16 Dose:  40 mg


   Documented by: 











- Objective


Vital Signs: 


                                   Vital Signs











Temperature  97.9 F   06/29/20 09:07


 


Pulse Rate  77   06/29/20 09:07


 


Respiratory Rate  20   06/29/20 09:07


 


Blood Pressure  118/72   06/29/20 09:07


 


O2 Sat by Pulse Oximetry (%)  99   06/28/20 22:42











Constitutional: Yes: No Distress, Calm


Cardiovascular: Yes: S1, S2


Respiratory: Yes: Regular, CTA Bilaterally


Gastrointestinal: Yes: Normal Bowel Sounds, Soft


Musculoskeletal: Yes: WNL


Extremities: Yes: WNL


Neurological: Yes: Alert, Oriented


Psychiatric: Yes: Alert, Oriented


Labs: 


                                    CBC, BMP





                                 06/29/20 05:47 





                                 06/29/20 06:00 





                                    INR, PTT











INR  1.18  (0.83-1.09)  H  06/26/20  13:25    














Assessment/Plan





oblem List





- Problems


(1) Choledocholithiasis


Code(s): K80.50 - CALCULUS OF BILE DUCT W/O CHOLANGITIS OR CHOLECYST W/O OBST   





(2) Suspected COVID-19 virus infection


Code(s): Z20.828 - CONTACT W AND EXPOSURE TO OTH VIRAL COMMUNICABLE DISEASES   





(3) Pneumonitis


Code(s): J18.9 - PNEUMONIA, UNSPECIFIED ORGANISM   





(4) Weight loss


Code(s): R63.4 - ABNORMAL WEIGHT LOSS   





(5) Anemia


Code(s): D64.9 - ANEMIA, UNSPECIFIED   





(6) Foot drop


Code(s): M21.379 - FOOT DROP, UNSPECIFIED FOOT   





(7) Diarrhea


Code(s): R19.7 - DIARRHEA, UNSPECIFIED   





8 hiv








plan


still awaiting all results


ordered the count cd4/cd8 count


i am going to start patient on pcp prophylaxis for now


will stop if cd4 count more than 200


will d/w pulmonary


continue zithro

## 2020-06-30 LAB
ANION GAP SERPL CALC-SCNC: 11 MMOL/L (ref 8–16)
ANISOCYTOSIS BLD QL: (no result)
BASOPHILS # BLD: 0 % (ref 0–2)
BUN SERPL-MCNC: 18.6 MG/DL (ref 7–18)
CALCIUM SERPL-MCNC: 8.6 MG/DL (ref 8.5–10.1)
CHLORIDE SERPL-SCNC: 113 MMOL/L (ref 98–107)
CO2 SERPL-SCNC: 18 MMOL/L (ref 21–32)
CREAT SERPL-MCNC: 1 MG/DL (ref 0.55–1.3)
DEPRECATED RDW RBC AUTO: 15.8 % (ref 11.9–15.9)
EOSINOPHIL # BLD: 0 % (ref 0–4.5)
GLIADIN IGA SER-ACNC: 7 UNITS (ref 0–19)
GLIADIN IGG SER IA-ACNC: 2 UNITS (ref 0–19)
GLUCOSE SERPL-MCNC: 222 MG/DL (ref 74–106)
HCT VFR BLD CALC: 26.8 % (ref 35.4–49)
HGB BLD-MCNC: 9 GM/DL (ref 11.7–16.9)
LYMPHOCYTES # BLD: 2.5 % (ref 8–40)
MACROCYTES BLD QL: 0
MCH RBC QN AUTO: 27.1 PG (ref 25.7–33.7)
MCHC RBC AUTO-ENTMCNC: 33.7 G/DL (ref 32–35.9)
MCV RBC: 80.2 FL (ref 80–96)
MONOCYTES # BLD AUTO: 3.4 % (ref 3.8–10.2)
NEUTROPHILS # BLD: 94.1 % (ref 42.8–82.8)
PLATELET # BLD AUTO: 217 K/MM3 (ref 134–434)
PLATELET BLD QL SMEAR: NORMAL
PMV BLD: 7.8 FL (ref 7.5–11.1)
POTASSIUM SERPLBLD-SCNC: 4.2 MMOL/L (ref 3.5–5.1)
RBC # BLD AUTO: 3.34 M/MM3 (ref 4–5.6)
SODIUM SERPL-SCNC: 141 MMOL/L (ref 136–145)
TTG IGG SER QL: < 2 U/ML (ref 0–5)
WBC # BLD AUTO: 5.7 K/MM3 (ref 4–10)

## 2020-06-30 RX ADMIN — ENOXAPARIN SODIUM SCH MG: 80 INJECTION SUBCUTANEOUS at 22:18

## 2020-06-30 RX ADMIN — NYSTATIN SCH UNITS: 100000 SUSPENSION ORAL at 17:33

## 2020-06-30 RX ADMIN — ATORVASTATIN CALCIUM SCH MG: 20 TABLET, FILM COATED ORAL at 22:17

## 2020-06-30 RX ADMIN — INSULIN ASPART SCH UNITS: 100 INJECTION, SOLUTION INTRAVENOUS; SUBCUTANEOUS at 11:51

## 2020-06-30 RX ADMIN — ENOXAPARIN SODIUM SCH MG: 80 INJECTION SUBCUTANEOUS at 10:09

## 2020-06-30 RX ADMIN — BUDESONIDE AND FORMOTEROL FUMARATE DIHYDRATE SCH PUFF: 80; 4.5 AEROSOL RESPIRATORY (INHALATION) at 23:01

## 2020-06-30 RX ADMIN — METHYLPREDNISOLONE SODIUM SUCCINATE SCH MG: 40 INJECTION, POWDER, FOR SOLUTION INTRAMUSCULAR; INTRAVENOUS at 10:10

## 2020-06-30 RX ADMIN — INSULIN ASPART SCH UNITS: 100 INJECTION, SOLUTION INTRAVENOUS; SUBCUTANEOUS at 22:21

## 2020-06-30 RX ADMIN — CEFEPIME HYDROCHLORIDE SCH MLS/HR: 1 INJECTION, POWDER, FOR SOLUTION INTRAMUSCULAR; INTRAVENOUS at 10:20

## 2020-06-30 RX ADMIN — INSULIN ASPART SCH UNITS: 100 INJECTION, SOLUTION INTRAVENOUS; SUBCUTANEOUS at 06:08

## 2020-06-30 RX ADMIN — CEFEPIME HYDROCHLORIDE SCH MLS/HR: 1 INJECTION, POWDER, FOR SOLUTION INTRAMUSCULAR; INTRAVENOUS at 02:40

## 2020-06-30 RX ADMIN — SODIUM CHLORIDE SCH MLS/HR: 9 INJECTION, SOLUTION INTRAVENOUS at 10:08

## 2020-06-30 RX ADMIN — BUDESONIDE AND FORMOTEROL FUMARATE DIHYDRATE SCH PUFF: 80; 4.5 AEROSOL RESPIRATORY (INHALATION) at 10:15

## 2020-06-30 RX ADMIN — PANTOPRAZOLE SODIUM SCH MG: 40 TABLET, DELAYED RELEASE ORAL at 10:10

## 2020-06-30 RX ADMIN — NYSTATIN SCH UNITS: 100000 SUSPENSION ORAL at 23:10

## 2020-06-30 RX ADMIN — NYSTATIN SCH UNITS: 100000 SUSPENSION ORAL at 14:00

## 2020-06-30 RX ADMIN — AZITHROMYCIN SCH MG: 250 TABLET, FILM COATED ORAL at 10:13

## 2020-06-30 RX ADMIN — INSULIN ASPART SCH UNITS: 100 INJECTION, SOLUTION INTRAVENOUS; SUBCUTANEOUS at 17:01

## 2020-06-30 RX ADMIN — METHYLPREDNISOLONE SODIUM SUCCINATE SCH MG: 40 INJECTION, POWDER, FOR SOLUTION INTRAMUSCULAR; INTRAVENOUS at 22:17

## 2020-06-30 NOTE — PN
Progress Note, Physician





- Current Medication List


Current Medications: 


Active Medications





Acetaminophen (Tylenol -)  650 mg PO Q4H PRN


   PRN Reason: FEVER


   Last Admin: 06/28/20 12:05 Dose:  650 mg


   Documented by: 


Albuterol Sulfate (Ventolin Hfa Inhaler -)  2 puff IH Q4H PRN


   PRN Reason: SHORT OF BREATH/WHEEZING


Atorvastatin Calcium (Lipitor -)  20 mg PO HS [facility]


   Last Admin: 06/29/20 21:19 Dose:  20 mg


   Documented by: 


Azithromycin (Zithromax -)  500 mg PO DAILY [facility]


   Last Admin: 06/29/20 10:16 Dose:  500 mg


   Documented by: 


Budesonide/Formoterol Fumarate (Symbicort 80/4.5mcg -)  2 puff IH BID [facility]


   Last Admin: 06/29/20 21:20 Dose:  2 puff


   Documented by: 


Enoxaparin Sodium (Lovenox -)  70 mg SQ BID [facility]


   Last Admin: 06/29/20 21:19 Dose:  70 mg


   Documented by: 


Cefepime HCl 1 gm/ Dextrose  1 mls @ 2 mls/hr IVPB Q8H-IV [facility]; Protocol


   Last Admin: 06/30/20 02:40 Dose:  2 mls/hr


   Documented by: 


Sodium Chloride (Normal Saline -)  1,000 mls @ 100 mls/hr IV ASDIR [facility]


   Last Admin: 06/29/20 23:22 Dose:  100 mls/hr


   Documented by: 


Insulin Aspart (Novolog Vial Sliding Scale -)  1 vial SQ ACHS [facility]; Protocol


   Last Admin: 06/30/20 06:08 Dose:  2 units


   Documented by: 


Methylprednisolone Sodium Succinate (Solu-Medrol -)  40 mg IVPUSH BID [facility]


   Last Admin: 06/29/20 21:19 Dose:  40 mg


   Documented by: 


Ondansetron HCl (Zofran -)  4 mg PO Q8H PRN


   PRN Reason: DYSPEPSIA


   Stop: 07/03/20 10:05


Pantoprazole Sodium (Protonix -)  40 mg PO DAILY [facility]


   Last Admin: 06/29/20 10:16 Dose:  40 mg


   Documented by: 


Trimethoprim/Sulfamethoxazole (Bactrim Ds -)  1 each PO MoWeFr@1000 [facility]


   Last Admin: 06/29/20 11:56 Dose:  1 each


   Documented by: 











- Objective


Vital Signs: 


                                   Vital Signs











Temperature  97.5 F L  06/30/20 05:44


 


Pulse Rate  83   06/30/20 05:44


 


Respiratory Rate  20   06/30/20 05:44


 


Blood Pressure  126/65   06/30/20 05:44


 


O2 Sat by Pulse Oximetry (%)  97   06/29/20 21:00











Labs: 


                                    CBC, BMP





                                 06/30/20 05:50 





                                 06/30/20 05:50 





                                    INR, PTT











INR  1.18  (0.83-1.09)  H  06/26/20  13:25

## 2020-06-30 NOTE — PN
Progress Note, Physician


History of Present Illness: 





pt less sob w steroids


vss no temp 2 days


good apetite


loose bm still


better spirits today


pt spoke to family about illness


py w oral thrush


oob








- Current Medication List


Current Medications: 


Active Medications





Acetaminophen (Tylenol -)  650 mg PO Q4H PRN


   PRN Reason: FEVER


   Last Admin: 06/28/20 12:05 Dose:  650 mg


   Documented by: 


Albuterol Sulfate (Ventolin Hfa Inhaler -)  2 puff IH Q4H PRN


   PRN Reason: SHORT OF BREATH/WHEEZING


Atorvastatin Calcium (Lipitor -)  20 mg PO HS [facility]


   Last Admin: 06/29/20 21:19 Dose:  20 mg


   Documented by: 


Azithromycin (Zithromax -)  500 mg PO DAILY [facility]


   Last Admin: 06/30/20 10:13 Dose:  500 mg


   Documented by: 


Budesonide/Formoterol Fumarate (Symbicort 80/4.5mcg -)  2 puff IH BID [facility]


   Last Admin: 06/30/20 10:15 Dose:  2 puff


   Documented by: 


Enoxaparin Sodium (Lovenox -)  70 mg SQ BID [facility]


   Last Admin: 06/30/20 10:09 Dose:  70 mg


   Documented by: 


Cefepime HCl 1 gm/ Dextrose  1 mls @ 2 mls/hr IVPB Q8H-IV [facility]; Protocol


   Last Admin: 06/30/20 10:20 Dose:  2 mls/hr


   Documented by: 


Sodium Chloride (Normal Saline -)  1,000 mls @ 100 mls/hr IV ASDIR [facility]


   Last Admin: 06/30/20 10:08 Dose:  100 mls/hr


   Documented by: 


Insulin Aspart (Novolog Vial Sliding Scale -)  1 vial SQ ACHS [facility]; Protocol


   Last Admin: 06/30/20 06:08 Dose:  2 units


   Documented by: 


Methylprednisolone Sodium Succinate (Solu-Medrol -)  40 mg IVPUSH BID [facility]


   Last Admin: 06/30/20 10:10 Dose:  40 mg


   Documented by: 


Ondansetron HCl (Zofran -)  4 mg PO Q8H PRN


   PRN Reason: DYSPEPSIA


   Stop: 07/03/20 10:05


Pantoprazole Sodium (Protonix -)  40 mg PO DAILY [facility]


   Last Admin: 06/30/20 10:10 Dose:  40 mg


   Documented by: 


Trimethoprim/Sulfamethoxazole (Bactrim Ds -)  1 each PO MoWeFr@1000 [facility]


   Last Admin: 06/29/20 11:56 Dose:  1 each


   Documented by: 











- Objective


Vital Signs: 


                                   Vital Signs











Temperature  97.8 F   06/30/20 08:28


 


Pulse Rate  94 H  06/30/20 08:28


 


Respiratory Rate  18   06/30/20 08:28


 


Blood Pressure  115/72   06/30/20 08:28


 


O2 Sat by Pulse Oximetry (%)  97   06/29/20 21:00











Constitutional: Yes: No Distress, Pallor


HENT: Yes: Hoarseness, Tonsillar Exudate, Other (oral thrush)


Neck: Yes: Rigid


Respiratory: Yes: SOB on Exertion


Gastrointestinal: Yes: WNL


...Rectal Exam: Yes: Deferred


Genitourinary: Yes: WNL


Breast(s): Yes: WNL


Musculoskeletal: Yes: WNL


Extremities: Yes: WNL


Edema: No


Peripheral Pulses WNL: Yes


Peripheral Pulses: Left Radial: 2+, Right Radial: 2+, Left Doralis Pedis: 2+, 

Right Dorsalis Pedis: 2+, Left Femoral: 2+, Right Femoral: 2+


Integumentary: Yes: WNL


Neurological: Yes: WNL, Weakness


Psychiatric: Yes: WNL


Labs: 


                                    CBC, BMP





                                 06/30/20 05:50 





                                 06/30/20 05:50 





                                    INR, PTT











INR  1.18  (0.83-1.09)  H  06/26/20  13:25    














Assessment/Plan





oral nystatin switch swallow


p/t tx


see if 02 needed out pt


will walk w wiyhout to see oxi and need of 02 tx out pt


cd4 ct take long time to get


may d/c to ? [facility] for [facility] tx


d/c telemetry


d/c planning soon


chk covid igm atibody to r/o early covid

## 2020-06-30 NOTE — PN
Progress Note, Physician


History of Present Illness: 





feels much better


dirrhoea has resolved


says appetite was good


was able to eat properly








- Current Medication List


Current Medications: 


Active Medications





Acetaminophen (Tylenol -)  650 mg PO Q4H PRN


   PRN Reason: FEVER


   Last Admin: 06/28/20 12:05 Dose:  650 mg


   Documented by: 


Albuterol Sulfate (Ventolin Hfa Inhaler -)  2 puff IH Q4H PRN


   PRN Reason: SHORT OF BREATH/WHEEZING


Atorvastatin Calcium (Lipitor -)  20 mg PO HS [facility]


   Last Admin: 06/29/20 21:19 Dose:  20 mg


   Documented by: 


Azithromycin (Zithromax -)  500 mg PO DAILY [facility]


   Last Admin: 06/30/20 10:13 Dose:  500 mg


   Documented by: 


Budesonide/Formoterol Fumarate (Symbicort 80/4.5mcg -)  2 puff IH BID [facility]


   Last Admin: 06/30/20 10:15 Dose:  2 puff


   Documented by: 


Enoxaparin Sodium (Lovenox -)  70 mg SQ BID [facility]


   Last Admin: 06/30/20 10:09 Dose:  70 mg


   Documented by: 


Cefepime HCl 1 gm/ Dextrose  1 mls @ 2 mls/hr IVPB Q8H-IV [facility]; Protocol


   Last Admin: 06/30/20 10:20 Dose:  2 mls/hr


   Documented by: 


Sodium Chloride (Normal Saline -)  1,000 mls @ 100 mls/hr IV ASDIR [facility]


   Last Admin: 06/30/20 10:08 Dose:  100 mls/hr


   Documented by: 


Insulin Aspart (Novolog Vial Sliding Scale -)  1 vial SQ ACHS [facility]; Protocol


   Last Admin: 06/30/20 11:51 Dose:  2 units


   Documented by: 


Methylprednisolone Sodium Succinate (Solu-Medrol -)  40 mg IVPUSH BID [facility]


   Last Admin: 06/30/20 10:10 Dose:  40 mg


   Documented by: 


Nystatin (Nystatin Oral Suspension -)  500,000 units PO Q6HPO [facility]


   Last Admin: 06/30/20 14:00 Dose:  500,000 units


   Documented by: 


Ondansetron HCl (Zofran -)  4 mg PO Q8H PRN


   PRN Reason: DYSPEPSIA


   Stop: 07/03/20 10:05


Pantoprazole Sodium (Protonix -)  40 mg PO DAILY [facility]


   Last Admin: 06/30/20 10:10 Dose:  40 mg


   Documented by: 


Trimethoprim/Sulfamethoxazole (Bactrim Ds -)  1 each PO MoWeFr@1000 [facility]


   Last Admin: 06/29/20 11:56 Dose:  1 each


   Documented by: 











- Objective


Vital Signs: 


                                   Vital Signs











Temperature  98.3 F   06/30/20 13:40


 


Pulse Rate  99 H  06/30/20 13:40


 


Respiratory Rate  18   06/30/20 13:40


 


Blood Pressure  110/62   06/30/20 13:40


 


O2 Sat by Pulse Oximetry (%)  98   06/30/20 09:00











Constitutional: Yes: No Distress, Calm


Eyes: Yes: Conjunctiva Clear


HENT: Yes: Atraumatic, Normocephalic


Neck: Yes: Supple, Trachea Midline


Cardiovascular: Yes: S1, S2


Respiratory: Yes: Regular, CTA Bilaterally


Gastrointestinal: Yes: Normal Bowel Sounds, Soft


Musculoskeletal: Yes: WNL


Extremities: Yes: WNL


Neurological: Yes: Alert, Oriented


Psychiatric: Yes: Alert, Oriented


Labs: 


                                    CBC, BMP





                                 06/30/20 05:50 





                                    INR, PTT











INR  1.18  (0.83-1.09)  H  06/26/20  13:25    














Assessment/Plan





oblem List





- Problems


(1) Choledocholithiasis


Code(s): K80.50 - CALCULUS OF BILE DUCT W/O CHOLANGITIS OR CHOLECYST W/O OBST   





(2) Suspected COVID-19 virus infection


Code(s): Z20.828 - CONTACT W AND EXPOSURE TO OTH VIRAL COMMUNICABLE DISEASES   





(3) Pneumonitis


Code(s): J18.9 - PNEUMONIA, UNSPECIFIED ORGANISM   





(4) Weight loss


Code(s): R63.4 - ABNORMAL WEIGHT LOSS   





(5) Anemia


Code(s): D64.9 - ANEMIA, UNSPECIFIED   





(6) Foot drop


Code(s): M21.379 - FOOT DROP, UNSPECIFIED FOOT   





(7) Diarrhea


Code(s): R19.7 - DIARRHEA, UNSPECIFIED   





8 hiv








plan


continue current mgmt


resp support


physio


nutrition


follow up in [facility]


quantiferon send


wbc has normalized

## 2020-07-01 LAB
A2 MACROGLOB SERPL-MCNC: 154 MG/DL (ref 110–276)
ALT SERPL-CCNC: 12 IU/L (ref 0–55)
ANION GAP SERPL CALC-SCNC: 5 MMOL/L (ref 8–16)
ANISOCYTOSIS BLD QL: (no result)
BASOPHILS # BLD: 0 % (ref 0–2)
BUN SERPL-MCNC: 16 MG/DL (ref 7–18)
C-ANCA TITR SER: (no result) TITER
CALCIUM SERPL-MCNC: 8.2 MG/DL (ref 8.5–10.1)
CHLORIDE SERPL-SCNC: 116 MMOL/L (ref 98–107)
CHOLEST SERPL-MCNC: 78 MG/DL (ref 100–199)
CO2 SERPL-SCNC: 22 MMOL/L (ref 21–32)
CREAT SERPL-MCNC: 0.9 MG/DL (ref 0.55–1.3)
DEPRECATED RDW RBC AUTO: 16 % (ref 11.9–15.9)
EOSINOPHIL # BLD: 0 % (ref 0–4.5)
GLUCOSE SERPL-MCNC: 251 MG/DL (ref 74–106)
GLUCOSE SERPLBLD-MCNC: 296 MG/DL (ref 65–99)
HCT VFR BLD CALC: 25.4 % (ref 35.4–49)
HGB BLD-MCNC: 8.4 GM/DL (ref 11.7–16.9)
LIVER FIBR SCORE SERPL CALC.FIBROSURE: 0.19 (ref 0–0.21)
LYMPHOCYTES # BLD: 1.9 % (ref 8–40)
MACROCYTES BLD QL: 0
MCH RBC QN AUTO: 26.7 PG (ref 25.7–33.7)
MCHC RBC AUTO-ENTMCNC: 33.1 G/DL (ref 32–35.9)
MCV RBC: 80.5 FL (ref 80–96)
MONOCYTES # BLD AUTO: 2.8 % (ref 3.8–10.2)
NEUTROPHILS # BLD: 95.3 % (ref 42.8–82.8)
PLATELET # BLD AUTO: 198 K/MM3 (ref 134–434)
PLATELET BLD QL SMEAR: NORMAL
PMV BLD: 8 FL (ref 7.5–11.1)
POTASSIUM SERPLBLD-SCNC: 4.1 MMOL/L (ref 3.5–5.1)
RBC # BLD AUTO: 3.15 M/MM3 (ref 4–5.6)
SODIUM SERPL-SCNC: 143 MMOL/L (ref 136–145)
WBC # BLD AUTO: 5.3 K/MM3 (ref 4–10)

## 2020-07-01 RX ADMIN — METHYLPREDNISOLONE SODIUM SUCCINATE SCH MG: 40 INJECTION, POWDER, FOR SOLUTION INTRAMUSCULAR; INTRAVENOUS at 10:52

## 2020-07-01 RX ADMIN — NYSTATIN SCH UNITS: 100000 SUSPENSION ORAL at 07:59

## 2020-07-01 RX ADMIN — BUDESONIDE AND FORMOTEROL FUMARATE DIHYDRATE SCH PUFF: 80; 4.5 AEROSOL RESPIRATORY (INHALATION) at 22:57

## 2020-07-01 RX ADMIN — PANTOPRAZOLE SODIUM SCH MG: 40 TABLET, DELAYED RELEASE ORAL at 10:52

## 2020-07-01 RX ADMIN — SULFAMETHOXAZOLE AND TRIMETHOPRIM SCH EACH: 800; 160 TABLET ORAL at 10:51

## 2020-07-01 RX ADMIN — AZITHROMYCIN SCH MG: 250 TABLET, FILM COATED ORAL at 10:52

## 2020-07-01 RX ADMIN — INSULIN ASPART SCH UNITS: 100 INJECTION, SOLUTION INTRAVENOUS; SUBCUTANEOUS at 12:09

## 2020-07-01 RX ADMIN — ENOXAPARIN SODIUM SCH MG: 80 INJECTION SUBCUTANEOUS at 10:51

## 2020-07-01 RX ADMIN — BUDESONIDE AND FORMOTEROL FUMARATE DIHYDRATE SCH PUFF: 80; 4.5 AEROSOL RESPIRATORY (INHALATION) at 10:53

## 2020-07-01 RX ADMIN — INSULIN ASPART SCH UNITS: 100 INJECTION, SOLUTION INTRAVENOUS; SUBCUTANEOUS at 08:02

## 2020-07-01 NOTE — DS
Physical Examination


Vital Signs: 


                                   Vital Signs











Temperature  98.3 F   07/01/20 10:00


 


Pulse Rate  93 H  07/01/20 10:00


 


Respiratory Rate  20   07/01/20 10:00


 


Blood Pressure  94/46 L  07/01/20 10:00


 


O2 Sat by Pulse Oximetry (%)  95   07/01/20 09:00











Constitutional: Yes: No Distress, Pallor


HENT: Yes: WNL, Tonsillar Exudate


Cardiovascular: Yes: WNL


Respiratory: Yes: SOB on Exertion


Gastrointestinal: Yes: WNL


...Rectal Exam: Yes: Deferred


Renal/: Yes: WNL, Urethral Discharge


Musculoskeletal: Yes: WNL


Extremities: Yes: WNL


Edema: No


Peripheral Pulses WNL: Yes


Integumentary: Yes: WNL


Neurological: Yes: WNL


...Motor Strength: WNL


Psychiatric: Yes: WNL


Labs: 


                                    CBC, BMP





                                 07/01/20 05:40 





                                 07/01/20 06:00 











Discharge Summary


Problems reviewed: Yes


Reason For Visit: TACYCARDIA WEIGHT LOSS


Current Active Problems





Abnormal liver enzymes (Acute)


Altered bowel habits (Acute)


Anemia (Acute)


Choledocholithiasis (Acute)


Cocaine abuse in remission (Acute)


Diabetes mellitus (Acute)


Diarrhea (Acute)


Dysgeusia (Acute)


Fatty liver (Acute)


Foot drop (Acute)


Ground glass opacity present on imaging of lung (Acute)


HIV (human immunodeficiency virus infection) (Acute)


Pneumonitis (Acute)


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


Shortness of breath (Acute)


Suspected COVID-19 virus infection (Acute)


Weight loss (Acute)








Condition: Fair





- Instructions


Diet, Activity, Other Instructions: 


pt d/c home today





f/u [facility]


needs o2 tx use for puls oxi l ess 88 percent


oral nystatin bid 7 days more


zithromax 500 olivo


bactrim ds 1 tab po olivo


tapered steroids po


cont levimir sq 20 olivo


cont symbcort and ventolin tx inhalers


cont atorovostatin po





Referrals: 


Canelo Jacob MD [Primary Care Provider] - 


Disposition: HOME





- Home Medications


Comprehensive Discharge Medication List: 


Ambulatory Orders





Atorvastatin Ca [Lipitor] 20 mg PO HS 06/26/20 


Ferrous Sulfate [Iron] 325 mg PO DAILY 06/26/20 


Lisinopril [Prinivil] 10 mg PO DAILY 06/26/20 


Budesonide/Formeterol Fumarate [SYMBICORT 160/4.5mcg -] 1 inh PO BID 06/27/20

## 2020-07-01 NOTE — PN
Progress Note, Physician





- Current Medication List


Current Medications: 


Active Medications





Acetaminophen (Tylenol -)  650 mg PO Q4H PRN


   PRN Reason: FEVER


   Last Admin: 06/28/20 12:05 Dose:  650 mg


   Documented by: 


Albuterol Sulfate (Ventolin Hfa Inhaler -)  2 puff IH Q4H PRN


   PRN Reason: SHORT OF BREATH/WHEEZING


Atorvastatin Calcium (Lipitor -)  20 mg PO [facility]


   Last Admin: 06/30/20 22:17 Dose:  20 mg


   Documented by: 


Azithromycin (Zithromax -)  500 mg PO DAILY [facility]


   Last Admin: 07/01/20 10:52 Dose:  500 mg


   Documented by: 


Budesonide/Formoterol Fumarate (Symbicort 80/4.5mcg -)  2 puff IH BID [facility]


   Last Admin: 07/01/20 10:53 Dose:  2 puff


   Documented by: 


Enoxaparin Sodium (Lovenox -)  70 mg SQ BID [facility]


   Last Admin: 07/01/20 10:51 Dose:  70 mg


   Documented by: 


Insulin Aspart (Novolog Vial Sliding Scale -)  1 vial SQ [facility]; Protocol


   Last Admin: 07/01/20 12:09 Dose:  4 units


   Documented by: 


Insulin Detemir (Levemir Vial)  15 units SQ [facility]


Methylprednisolone Sodium Succinate (Solu-Medrol -)  40 mg IVPUSH BID [facility]


   Last Admin: 07/01/20 10:52 Dose:  40 mg


   Documented by: 


Nystatin (Nystatin Oral Suspension -)  1,000 units PO Q6HPO [facility]


Ondansetron HCl (Zofran -)  4 mg PO Q8H PRN


   PRN Reason: DYSPEPSIA


   Stop: 07/03/20 10:05


Pantoprazole Sodium (Protonix -)  40 mg PO DAILY [facility]


   Last Admin: 07/01/20 10:52 Dose:  40 mg


   Documented by: 


Trimethoprim/Sulfamethoxazole (Bactrim Ds -)  1 each PO MoWeFr@1000 [facility]


   Last Admin: 07/01/20 10:51 Dose:  1 each


   Documented by: 











- Objective


Vital Signs: 


                                   Vital Signs











Temperature  98.3 F   07/01/20 10:00


 


Pulse Rate  93 H  07/01/20 10:00


 


Respiratory Rate  20   07/01/20 10:00


 


Blood Pressure  94/46 L  07/01/20 10:00


 


O2 Sat by Pulse Oximetry (%)  95   07/01/20 09:00











Labs: 


                                    CBC, BMP





                                 07/01/20 05:40 





                                 07/01/20 06:00 





                                    INR, PTT











INR  1.18  (0.83-1.09)  H  06/26/20  13:25

## 2020-07-01 NOTE — PN
Progress Note, Physician


History of Present Illness: 





pulmonary





alert,comfortable,less dyspneic,less cough





- Current Medication List


Current Medications: 


Active Medications





Acetaminophen (Tylenol -)  650 mg PO Q4H PRN


   PRN Reason: FEVER


   Last Admin: 06/28/20 12:05 Dose:  650 mg


   Documented by: 


Albuterol Sulfate (Ventolin Hfa Inhaler -)  2 puff IH Q4H PRN


   PRN Reason: SHORT OF BREATH/WHEEZING


Atorvastatin Calcium (Lipitor -)  20 mg PO HS [facility]


   Last Admin: 06/30/20 22:17 Dose:  20 mg


   Documented by: 


Azithromycin (Zithromax -)  500 mg PO DAILY [facility]


   Last Admin: 06/30/20 10:13 Dose:  500 mg


   Documented by: 


Budesonide/Formoterol Fumarate (Symbicort 80/4.5mcg -)  2 puff IH BID [facility]


   Last Admin: 06/30/20 23:01 Dose:  2 puff


   Documented by: 


Enoxaparin Sodium (Lovenox -)  70 mg SQ BID [facility]


   Last Admin: 06/30/20 22:18 Dose:  70 mg


   Documented by: 


Sodium Chloride (Normal Saline -)  1,000 mls @ 100 mls/hr IV ASDIR [facility]


   Last Admin: 06/30/20 10:08 Dose:  100 mls/hr


   Documented by: 


Insulin Aspart (Novolog Vial Sliding Scale -)  [facility]; Protocol


   Last Admin: 06/30/20 22:21 Dose:  5 units


   Documented by: 


Methylprednisolone Sodium Succinate (Solu-Medrol -)  40 mg IVPUSH BID [facility]


   Last Admin: 06/30/20 22:17 Dose:  40 mg


   Documented by: 


Nystatin (Nystatin Oral Suspension -)  500,000 units PO Q6HPO [facility]


   Last Admin: 06/30/20 23:10 Dose:  500,000 units


   Documented by: 


Ondansetron HCl (Zofran -)  4 mg PO Q8H PRN


   PRN Reason: DYSPEPSIA


   Stop: 07/03/20 10:05


Pantoprazole Sodium (Protonix -)  40 mg PO DAILY [facility]


   Last Admin: 06/30/20 10:10 Dose:  40 mg


   Documented by: 


Trimethoprim/Sulfamethoxazole (Bactrim Ds -)  1 each PO MoWeFr@1000 [facility]


   Last Admin: 06/29/20 11:56 Dose:  1 each


   Documented by: 











- Objective


Vital Signs: 


                                   Vital Signs











Temperature  98.2 F   07/01/20 06:00


 


Pulse Rate  85   07/01/20 06:00


 


Respiratory Rate  18   07/01/20 06:00


 


Blood Pressure  120/72   07/01/20 06:00


 


O2 Sat by Pulse Oximetry (%)  97   06/30/20 21:00











Constitutional: Yes: Well Nourished, Calm


Eyes: Yes: WNL


HENT: Yes: WNL


Neck: Yes: WNL


Cardiovascular: Yes: Regular Rate and Rhythm, S1, S2


Respiratory: Yes: Rales (bibasilar crackles)


Gastrointestinal: Yes: Normal Bowel Sounds, Soft


Extremities: Yes: WNL


Edema: No


Labs: 


                                    CBC, BMP





                                 07/01/20 05:40 





                                 07/01/20 06:00 





                                    INR, PTT











INR  1.18  (0.83-1.09)  H  06/26/20  13:25    














Problem List





- Problems


(1) HIV (human immunodeficiency virus infection)


Code(s): B20 - HUMAN IMMUNODEFICIENCY VIRUS [HIV] DISEASE   





(2) Abnormal liver enzymes


Code(s): R74.8 - ABNORMAL LEVELS OF OTHER SERUM ENZYMES   





(3) Diabetes mellitus


Code(s): E11.9 - TYPE 2 DIABETES MELLITUS WITHOUT COMPLICATIONS   





(4) Ground glass opacity present on imaging of lung


Code(s): R91.8 - OTHER NONSPECIFIC ABNORMAL FINDING OF LUNG FIELD   





(5) Pneumonitis


Code(s): J18.9 - PNEUMONIA, UNSPECIFIED ORGANISM   





(6) Shortness of breath


Code(s): R06.02 - SHORTNESS OF BREATH   





Assessment/Plan








A/P


 HIV


Suspect PCP Pneumonia


Leukopenia improving


COPD


HTN


Hyperlipidemia


ANEMIA





-  sputum cytology to look for pneumocystis


-  O2 to keep SpO2 >90%


-  antibiotics per ID


-  if unable to produce sputum, will perform bronchoscopy for BAL


-  DVT prophylaxis


- BACTRIM


- Chest x-ray


- awaiting cd4 ct





DR ZEPEDA

## 2020-07-01 NOTE — CON.HO
Consult - text type





- Consultation


Consultation Note: 





White count normalized - now  5.3. Hb 8.4 and not symptomatic. Plt 198.





Erythropoietin level inappropriately low at 4.9, confirming anemia of HIV and 

chronic disease. If drops below 8, or becomes symptomatic from anemia, can start

Procrit 40.000.00 weekly.

## 2020-07-02 VITALS — SYSTOLIC BLOOD PRESSURE: 133 MMHG | HEART RATE: 79 BPM | DIASTOLIC BLOOD PRESSURE: 79 MMHG | TEMPERATURE: 98.3 F

## 2020-07-02 RX ADMIN — BUDESONIDE AND FORMOTEROL FUMARATE DIHYDRATE SCH PUFF: 80; 4.5 AEROSOL RESPIRATORY (INHALATION) at 09:02

## 2020-07-02 NOTE — PN
Progress Note, Physician





- Current Medication List


Current Medications: 


Active Medications





Albuterol Sulfate (Ventolin Hfa Inhaler -)  2 puff IH Q4H PRN


   PRN Reason: SHORT OF BREATH/WHEEZING


Budesonide/Formoterol Fumarate (Symbicort 80/4.5mcg -)  2 puff IH BID [facility]


   Last Admin: 07/02/20 09:02 Dose:  2 puff


   Documented by: 











- Objective


Vital Signs: 


                                   Vital Signs











Temperature  98.0 F   07/02/20 08:18


 


Pulse Rate  77   07/02/20 08:18


 


Respiratory Rate  20   07/02/20 08:18


 


Blood Pressure  138/66   07/02/20 08:18


 


O2 Sat by Pulse Oximetry (%)  100   07/02/20 08:32











Labs: 


                                    CBC, BMP





                                 07/01/20 05:40 





                                 07/01/20 06:00 





                                    INR, PTT











INR  1.18  (0.83-1.09)  H  06/26/20  13:25

## 2020-07-02 NOTE — PN
Progress Note, Physician


History of Present Illness: 





pulmonary








alert,comfortable,-resp distress





- Current Medication List


Current Medications: 


Active Medications





Albuterol Sulfate (Ventolin Hfa Inhaler -)  2 puff IH Q4H PRN


   PRN Reason: SHORT OF BREATH/WHEEZING


Budesonide/Formoterol Fumarate (Symbicort 80/4.5mcg -)  2 puff IH BID [facility]


   Last Admin: 07/01/20 22:57 Dose:  2 puff


   Documented by: 











- Objective


Vital Signs: 


                                   Vital Signs











Temperature  98.0 F   07/02/20 06:00


 


Pulse Rate  97 H  07/02/20 06:00


 


Respiratory Rate  18   07/02/20 06:00


 


Blood Pressure  122/80   07/02/20 06:00


 


O2 Sat by Pulse Oximetry (%)  99   07/01/20 21:00











Constitutional: Yes: Well Nourished, Calm


Eyes: Yes: WNL


HENT: Yes: WNL


Neck: Yes: WNL


Cardiovascular: Yes: Regular Rate and Rhythm, S1, S2


Respiratory: Yes: CTA Bilaterally


Gastrointestinal: Yes: Normal Bowel Sounds, Soft


Extremities: Yes: WNL


Edema: No


Labs: 


                                    CBC, BMP





                                 07/01/20 05:40 





                                 07/01/20 06:00 





                                    INR, PTT











INR  1.18  (0.83-1.09)  H  06/26/20  13:25    














Problem List





- Problems


(1) HIV (human immunodeficiency virus infection)


Code(s): B20 - HUMAN IMMUNODEFICIENCY VIRUS [HIV] DISEASE   





(2) Abnormal liver enzymes


Code(s): R74.8 - ABNORMAL LEVELS OF OTHER SERUM ENZYMES   





(3) Diabetes mellitus


Code(s): E11.9 - TYPE 2 DIABETES MELLITUS WITHOUT COMPLICATIONS   





(4) Ground glass opacity present on imaging of lung


Code(s): R91.8 - OTHER NONSPECIFIC ABNORMAL FINDING OF LUNG FIELD   





(5) Pneumonitis


Code(s): J18.9 - PNEUMONIA, UNSPECIFIED ORGANISM   





(6) Shortness of breath


Code(s): R06.02 - SHORTNESS OF BREATH   





Assessment/Plan








A/P


 HIV


Suspect PCP Pneumonia


Leukopenia improving


COPD


HTN


Hyperlipidemia


ANEMIA








-  O2 to keep SpO2 >90%


-  antibiotics per ID


-  DVT prophylaxis


- BACTRIM


- awaiting cd4 ct





DR ZEPEDA

## 2020-08-27 NOTE — PN
Obdulia Ray Patient Age: 66 year old  MESSAGE: Interpreting service used: No      IM/FP- Refused to provide any further information-     Caller is requesting to speak to MONA- Iván- Connect call to Triage queue- Route message to provider's clinical support pool.         WEIGHT AND HEIGHT:   Wt Readings from Last 1 Encounters:   07/22/20 114.2 kg (251 lb 12.8 oz)     Ht Readings from Last 1 Encounters:   07/10/20 5' 2\" (1.575 m)     BMI Readings from Last 1 Encounters:   07/22/20 46.05 kg/m²       ALLERGIES:  Amoxicillin  Current Outpatient Medications   Medication   • hydrochlorothiazide (HYDRODIURIL) 25 MG tablet   • tramadol (ULTRAM-ER) 100 MG 24 hr tablet   • lisinopril (ZESTRIL) 10 MG tablet   • acetaminophen-codeine (TYLENOL NO.3) 300-30 MG per tablet   • naproxen (NAPROSYN) 500 MG tablet   • lisinopril (ZESTRIL) 20 MG tablet   • metoPROLOL succinate (TOPROL-XL) 50 MG 24 hr tablet   • MAGNESIUM OXIDE PO   • Multiple Vitamin (MULTI VITAMIN PO)   • Glucosamine HCl (GLUCOSAMINE PO)   • meclizine HCl (ANTIVERT) 25 MG tablet   • vitamin E 1000 units capsule     No current facility-administered medications for this visit.      PHARMACY to use: SEE BELOW           Pharmacy preference(s) on file:   [id number] - [place], [place] - [id number] [address]  Phone: [phone] Fax: [phone]      CALL BACK INFO: DO NOT LEAVE A MESSAGE - contact patient directly  ROUTING: Patient's physician/staff        PCP: Genesis Bonilla MD         INS: Payor: [employer] [place] / Plan: BLUE [id number] / Product Type: O MISC   PATIENT ADDRESS:  [address] [place] [address] [address] [place] [address]       Progress Note, Physician


History of Present Illness: 





PULMONARY





ALERT,FEELING BETTER,-CP ,DYSPNEA IMPROVING





- Current Medication List


Current Medications: 


Active Medications





Acetaminophen (Tylenol -)  650 mg PO Q4H PRN


   PRN Reason: FEVER


   Last Admin: 06/28/20 12:05 Dose:  650 mg


   Documented by: 


Albuterol Sulfate (Ventolin Hfa Inhaler -)  2 puff IH Q4H PRN


   PRN Reason: SHORT OF BREATH/WHEEZING


Atorvastatin Calcium (Lipitor -)  20 mg PO HS [facility]


   Last Admin: 06/29/20 21:19 Dose:  20 mg


   Documented by: 


Azithromycin (Zithromax -)  500 mg PO DAILY [facility]


   Last Admin: 06/29/20 10:16 Dose:  500 mg


   Documented by: 


Budesonide/Formoterol Fumarate (Symbicort 80/4.5mcg -)  2 puff IH BID [facility]


   Last Admin: 06/29/20 21:20 Dose:  2 puff


   Documented by: 


Enoxaparin Sodium (Lovenox -)  70 mg SQ BID [facility]


   Last Admin: 06/29/20 21:19 Dose:  70 mg


   Documented by: 


Cefepime HCl 1 gm/ Dextrose  1 mls @ 2 mls/hr IVPB Q8H-IV [facility]; Protocol


   Last Admin: 06/30/20 02:40 Dose:  2 mls/hr


   Documented by: 


Sodium Chloride (Normal Saline -)  1,000 mls @ 100 mls/hr IV ASDIR [facility]


   Last Admin: 06/29/20 23:22 Dose:  100 mls/hr


   Documented by: 


Insulin Aspart (Novolog Vial Sliding Scale -)  1 vial SQ ACHS [facility]; Protocol


   Last Admin: 06/30/20 06:08 Dose:  2 units


   Documented by: 


Methylprednisolone Sodium Succinate (Solu-Medrol -)  40 mg IVPUSH BID [facility]


   Last Admin: 06/29/20 21:19 Dose:  40 mg


   Documented by: 


Ondansetron HCl (Zofran -)  4 mg PO Q8H PRN


   PRN Reason: DYSPEPSIA


   Stop: 07/03/20 10:05


Pantoprazole Sodium (Protonix -)  40 mg PO DAILY [facility]


   Last Admin: 06/29/20 10:16 Dose:  40 mg


   Documented by: 


Trimethoprim/Sulfamethoxazole (Bactrim Ds -)  1 each PO MoWeFr@1000 [facility]


   Last Admin: 06/29/20 11:56 Dose:  1 each


   Documented by: 











- Objective


Vital Signs: 


                                   Vital Signs











Temperature  97.5 F L  06/30/20 05:44


 


Pulse Rate  83   06/30/20 05:44


 


Respiratory Rate  20   06/30/20 05:44


 


Blood Pressure  126/65   06/30/20 05:44


 


O2 Sat by Pulse Oximetry (%)  97   06/29/20 21:00











Constitutional: Yes: Well Nourished, Calm


Eyes: Yes: WNL


HENT: Yes: WNL


Neck: Yes: WNL


Cardiovascular: Yes: Regular Rate and Rhythm, S1, S2


Respiratory: Yes: Rales (FEW BIBASAILR CRACKLES)


Gastrointestinal: Yes: Normal Bowel Sounds, Soft


Extremities: Yes: WNL


Edema: No


Labs: 


                                    CBC, BMP





                                 06/30/20 05:50 





                                 06/30/20 05:50 





                                    INR, PTT











INR  1.18  (0.83-1.09)  H  06/26/20  13:25    








                                Laboratory Tests











  06/28/20





  11:56


 


HIV Ag/Ab Combo Qual  Presumptive positive A*














Problem List





- Problems


(1) HIV (human immunodeficiency virus infection)


Code(s): B20 - HUMAN IMMUNODEFICIENCY VIRUS [HIV] DISEASE   





(2) Abnormal liver enzymes


Code(s): R74.8 - ABNORMAL LEVELS OF OTHER SERUM ENZYMES   





(3) Diabetes mellitus


Code(s): E11.9 - TYPE 2 DIABETES MELLITUS WITHOUT COMPLICATIONS   





(4) Ground glass opacity present on imaging of lung


Code(s): R91.8 - OTHER NONSPECIFIC ABNORMAL FINDING OF LUNG FIELD   





(5) Pneumonitis


Code(s): J18.9 - PNEUMONIA, UNSPECIFIED ORGANISM   





(6) Shortness of breath


Code(s): R06.02 - SHORTNESS OF BREATH   





Assessment/Plan








A/P


 HIV


Suspect PCP Pneumonia


Leukopenia improving


COPD


HTN


Hyperlipidemia





-  sputum cytology to look for pneumocystis


-  O2 to keep SpO2 >90%


-  antibiotics per ID


-  if unable to produce sputum, will perform bronchoscopy for BAL


-  DVT prophylaxis





DR ZEPEDA

## 2023-03-21 ENCOUNTER — HOSPITAL ENCOUNTER (EMERGENCY)
Dept: HOSPITAL 74 - JER | Age: 63
LOS: 1 days | Discharge: ANOTHER HOSPITAL | End: 2023-03-22
Payer: COMMERCIAL

## 2023-03-21 VITALS — BODY MASS INDEX: 33.4 KG/M2

## 2023-03-21 DIAGNOSIS — Z20.822: ICD-10-CM

## 2023-03-21 DIAGNOSIS — J18.9: ICD-10-CM

## 2023-03-21 DIAGNOSIS — R55: ICD-10-CM

## 2023-03-21 DIAGNOSIS — Z21: ICD-10-CM

## 2023-03-21 DIAGNOSIS — N17.9: ICD-10-CM

## 2023-03-21 DIAGNOSIS — E11.9: ICD-10-CM

## 2023-03-21 DIAGNOSIS — I21.3: Primary | ICD-10-CM

## 2023-03-21 PROCEDURE — 3E033GC INTRODUCTION OF OTHER THERAPEUTIC SUBSTANCE INTO PERIPHERAL VEIN, PERCUTANEOUS APPROACH: ICD-10-PCS

## 2023-03-21 PROCEDURE — 3E03329 INTRODUCTION OF OTHER ANTI-INFECTIVE INTO PERIPHERAL VEIN, PERCUTANEOUS APPROACH: ICD-10-PCS

## 2023-03-22 VITALS — DIASTOLIC BLOOD PRESSURE: 78 MMHG | SYSTOLIC BLOOD PRESSURE: 130 MMHG

## 2023-03-22 VITALS — TEMPERATURE: 98.1 F | HEART RATE: 112 BPM | RESPIRATION RATE: 18 BRPM

## 2023-03-22 LAB
ALBUMIN SERPL-MCNC: 3.6 G/DL (ref 3.4–5)
ALP SERPL-CCNC: 86 U/L (ref 45–117)
ALT SERPL-CCNC: 24 U/L (ref 13–61)
ANION GAP SERPL CALC-SCNC: 18 MMOL/L (ref 8–16)
APTT BLD: 29.3 SECONDS (ref 25.2–36.5)
AST SERPL-CCNC: 26 U/L (ref 15–37)
BASE EXCESS BLDV CALC-SCNC: -8.9 MMOL/L (ref -2–2)
BASOPHILS # BLD: 0.6 % (ref 0–2)
BILIRUB SERPL-MCNC: 0.4 MG/DL (ref 0.2–1)
BUN SERPL-MCNC: 39.4 MG/DL (ref 7–18)
CALCIUM SERPL-MCNC: 9.3 MG/DL (ref 8.5–10.1)
CHLORIDE SERPL-SCNC: 102 MMOL/L (ref 98–107)
CO2 SERPL-SCNC: 17 MMOL/L (ref 21–32)
CREAT SERPL-MCNC: 2.4 MG/DL (ref 0.55–1.3)
DEPRECATED RDW RBC AUTO: 13.7 % (ref 11.9–15.9)
EOSINOPHIL # BLD: 1 % (ref 0–4.5)
GLUCOSE SERPL-MCNC: 232 MG/DL (ref 74–106)
HCT VFR BLD CALC: 41.2 % (ref 35.4–49)
HCT VFR BLDV CALC: 43 % (ref 35.4–49)
HGB BLD-MCNC: 13.7 GM/DL (ref 11.7–16.9)
INR BLD: 1.03 (ref 0.83–1.09)
LYMPHOCYTES # BLD: 35.9 % (ref 8–40)
MAGNESIUM SERPL-MCNC: 2.2 MG/DL (ref 1.8–2.4)
MCH RBC QN AUTO: 29.9 PG (ref 25.7–33.7)
MCHC RBC AUTO-ENTMCNC: 33.3 G/DL (ref 32–35.9)
MCV RBC: 89.8 FL (ref 80–96)
MONOCYTES # BLD AUTO: 6.6 % (ref 3.8–10.2)
NEUTROPHILS # BLD: 55.9 % (ref 42.8–82.8)
PCO2 BLDV: 39.6 MMHG (ref 38–52)
PH BLDV: 7.26 [PH] (ref 7.31–7.41)
PHOSPHATE SERPL-MCNC: 4.1 MG/DL (ref 2.5–4.9)
PLATELET # BLD AUTO: 369 10^3/UL (ref 134–434)
PMV BLD: 7.5 FL (ref 7.5–11.1)
PROT SERPL-MCNC: 7.7 G/DL (ref 6.4–8.2)
PT PNL PPP: 12 SEC (ref 9.7–13)
RBC # BLD AUTO: 4.59 M/MM3 (ref 4–5.6)
SAO2 % BLDV: 77.3 % (ref 70–80)
SODIUM SERPL-SCNC: 136 MMOL/L (ref 136–145)
WBC # BLD AUTO: 10.4 K/MM3 (ref 4–10)